# Patient Record
Sex: FEMALE | Race: WHITE | NOT HISPANIC OR LATINO | Employment: FULL TIME | URBAN - METROPOLITAN AREA
[De-identification: names, ages, dates, MRNs, and addresses within clinical notes are randomized per-mention and may not be internally consistent; named-entity substitution may affect disease eponyms.]

---

## 2021-10-27 ENCOUNTER — OFFICE VISIT (OUTPATIENT)
Dept: NEUROLOGY | Facility: CLINIC | Age: 51
End: 2021-10-27

## 2021-10-27 VITALS
HEIGHT: 63 IN | TEMPERATURE: 97.1 F | HEART RATE: 75 BPM | SYSTOLIC BLOOD PRESSURE: 118 MMHG | WEIGHT: 293 LBS | DIASTOLIC BLOOD PRESSURE: 80 MMHG | BODY MASS INDEX: 51.91 KG/M2 | OXYGEN SATURATION: 98 %

## 2021-10-27 DIAGNOSIS — R25.1 TREMOR: Primary | ICD-10-CM

## 2021-10-27 PROCEDURE — 99244 OFF/OP CNSLTJ NEW/EST MOD 40: CPT | Performed by: NURSE PRACTITIONER

## 2021-10-27 RX ORDER — CETIRIZINE HYDROCHLORIDE 10 MG/1
10 TABLET ORAL DAILY
COMMUNITY
Start: 2009-04-01

## 2021-10-27 RX ORDER — DULOXETIN HYDROCHLORIDE 30 MG/1
30 CAPSULE, DELAYED RELEASE ORAL DAILY
COMMUNITY
Start: 2021-03-04 | End: 2022-01-18 | Stop reason: SDUPTHER

## 2021-10-27 RX ORDER — ACETAMINOPHEN,DIPHENHYDRAMINE HCL 500; 25 MG/1; MG/1
1 TABLET, FILM COATED ORAL NIGHTLY
COMMUNITY
Start: 2009-04-01 | End: 2022-07-27

## 2021-10-27 RX ORDER — TOPIRAMATE 25 MG/1
TABLET ORAL
Qty: 60 TABLET | Refills: 2 | Status: SHIPPED | OUTPATIENT
Start: 2021-10-27 | End: 2022-01-18

## 2021-10-27 RX ORDER — MULTIPLE VITAMINS W/ MINERALS TAB 9MG-400MCG
1 TAB ORAL DAILY
COMMUNITY
Start: 2020-06-01

## 2021-10-27 NOTE — PROGRESS NOTES
"Subjective:     Patient ID: Jovita Camara is a 51 y.o. female.    CC:   Chief Complaint   Patient presents with   • Tremors       HPI:   History of Present Illness   51 y.o. female presents as a new patient referred by BERNABE Enriquez for tremor.     Tremor in R hand, present for approximately 1 year and worsening over time. Denies pain, N/T. She is right handed and having difficulty with typing at work, cannot hold objects as long as she used to     Denies difficulty with ambulating, headaches, vision changes, vivid dreaming     Denies hx of kidney stones, no family hx of parkinsons or tremor     Medical Records reviewed:  R hand tremor, + ALESSANDRA seen Rheumatology likely arthritis.      The following portions of the patient's history were reviewed and updated as appropriate: allergies, current medications, past family history, past medical history, past social history, past surgical history and problem list.    Past Medical History:   Diagnosis Date   • Anxiety        Past Surgical History:   Procedure Laterality Date   • BILATERAL INSERTION OF EAR TUBES AND ADENOIDECTOMY         Social History     Socioeconomic History   • Marital status:    Tobacco Use   • Smoking status: Former Smoker   • Smokeless tobacco: Never Used   Vaping Use   • Vaping Use: Never used   Substance and Sexual Activity   • Alcohol use: Not Currently   • Drug use: Never       History reviewed. No pertinent family history.     Objective:  /80   Pulse 75   Temp 97.1 °F (36.2 °C) (Temporal)   Ht 158.8 cm (62.5\")   Wt (!) 165 kg (363 lb 6.4 oz)   SpO2 98%   BMI 65.41 kg/m²     Neurologic Exam     Mental Status   Oriented to person, place, and time.   Follows 3 step commands.   Attention: normal. Concentration: normal.   Speech: speech is normal   Level of consciousness: alert  Knowledge: good and consistent with education.   Able to name object. Able to read. Able to repeat. Able to write. Normal comprehension.     Cranial Nerves "     CN II   Visual fields full to confrontation.   Visual acuity: normal  Right visual field deficit: none  Left visual field deficit: none     CN III, IV, VI   Pupils are equal, round, and reactive to light.  Extraocular motions are normal.   Right pupil: Shape: regular. Reactivity: brisk. Consensual response: intact.   Left pupil: Shape: regular. Reactivity: brisk. Consensual response: intact.   Nystagmus: none   Diplopia: none  Ophthalmoparesis: none  Upgaze: normal  Downgaze: normal  Conjugate gaze: present  Vestibulo-ocular reflex: present    CN V   Facial sensation intact.   Right corneal reflex: normal  Left corneal reflex: normal    CN VII   Right facial weakness: none  Left facial weakness: none    CN VIII   Hearing: intact    CN IX, X   Palate: symmetric  Right gag reflex: normal  Left gag reflex: normal    CN XI   Right sternocleidomastoid strength: normal  Left sternocleidomastoid strength: normal    CN XII   Tongue: not atrophic  Fasciculations: absent  Tongue deviation: none    Motor Exam   Muscle bulk: normal  Overall muscle tone: normal  Right arm tone: normal  Left arm tone: normal  Right leg tone: normal  Left leg tone: normal    Strength   Strength 5/5 throughout.     Sensory Exam   Light touch normal.     Gait, Coordination, and Reflexes     Gait  Gait: normal    Coordination   Finger to nose coordination: normal  Tandem walking coordination: normal    Tremor   Resting tremor: present (R)  Intention tremor: absent  Action tremor: absent    Reflexes   Reflexes 2+ except as noted.       Physical Exam  Vitals and nursing note reviewed.   Constitutional:       Appearance: Normal appearance.   HENT:      Head: Normocephalic and atraumatic.   Eyes:      Extraocular Movements: Extraocular movements intact and EOM normal.      Pupils: Pupils are equal, round, and reactive to light.   Cardiovascular:      Rate and Rhythm: Normal rate.   Pulmonary:      Effort: Pulmonary effort is normal.   Skin:      General: Skin is warm and dry.   Neurological:      Mental Status: She is alert and oriented to person, place, and time.      Coordination: Finger-Nose-Finger Test normal.      Gait: Gait is intact. Tandem walk normal.      Deep Tendon Reflexes: Strength normal.   Psychiatric:         Mood and Affect: Mood normal.         Speech: Speech normal.         Assessment/Plan:       Diagnoses and all orders for this visit:    1. Tremor (Primary)  Assessment & Plan:  Ordered MRI Brain     Labs     Start TPM 25 mg PO QHS X 1 week then 50 mg PO QHS thereafter     F/U in 6 weeks or sooner if needed    Orders:  -     topiramate (Topamax) 25 MG tablet; Take one tablet nightly X 1 week then 2 tablets nightly thereafter.  Dispense: 60 tablet; Refill: 2  -     MRI Brain With & Without Contrast; Future  -     Comprehensive Metabolic Panel; Future  -     CBC & Differential; Future  -     Sedimentation Rate; Future  -     TSH; Future  -     Vitamin B12 & Folate; Future           Reviewed medications, potential side effects and signs and symptoms to report. Discussed risk versus benefits of treatment plan with patient and/or family-including medications, labs and radiology that may be ordered. Addressed questions and concerns during visit. Patient and/or family verbalized understanding and agree with plan. Patient instructed to call the office with questions or concerns and report to ED with life-threatening symptoms.     AS THE PROVIDER, I PERSONALLY WORE PPE DURING ENTIRE FACE TO FACE ENCOUNTER IN CLINIC WITH THE PATIENT. PATIENT ALSO WORE PPE DURING ENTIRE FACE TO FACE ENCOUNTER EXCEPT FOR A MAX OF 30 SECONDS DURING NEUROLOGICAL EVALUATION OF CRANIAL NERVES AND THEN MASK WAS PLACED BACK OVER PATIENT FACE FOR REMAINDER OF VISIT. I WASHED MY HANDS BEFORE AND AFTER VISIT.    During this visit the following were done:  Labs Reviewed []    Labs Ordered [x]    Radiology Reports Reviewed []    Radiology Ordered [x]    PCP Records Reviewed  []    Referring Provider Records Reviewed [x]    ER Records Reviewed []    Hospital Records Reviewed []    History Obtained From Family []    Radiology Images Reviewed []    Other Reviewed []    Records Requested []      Return in about 6 weeks (around 12/8/2021).      Kaycee Stoddard, BERNABE  10/27/2021

## 2021-10-27 NOTE — ASSESSMENT & PLAN NOTE
Ordered MRI Brain     Labs     Start TPM 25 mg PO QHS X 1 week then 50 mg PO QHS thereafter     F/U in 6 weeks or sooner if needed

## 2021-11-22 ENCOUNTER — TELEPHONE (OUTPATIENT)
Dept: NEUROLOGY | Facility: CLINIC | Age: 51
End: 2021-11-22

## 2021-11-22 NOTE — TELEPHONE ENCOUNTER
Spoke to patient to inform her that insurance denied the MRI even after Kaycee talked to the insurance company.

## 2021-11-22 NOTE — TELEPHONE ENCOUNTER
Called peer to peer line back as the original person hung up @ [phone]    Tracking #[id number]    Got the insurance provider on the line and Got her over to Kaycee. Thanks!

## 2021-11-22 NOTE — TELEPHONE ENCOUNTER
Hiral Chisholm called to let us know under media there is a note needing more info for the MRI documented in office note. This was documented in the referral on 11/18/21, but the MRI has now been denied.  Hiral will notify the patient and have them call our office for next steps, but would like me to reach out to Central Scheduling and get the MRI on the 28th cancelled.     Letting provider know as well. Thanks!

## 2021-11-22 NOTE — TELEPHONE ENCOUNTER
Please let the patient know that her insurance denied her MRI, even with a peer to peer. I asked about their guidelines for CT scan which was the same. I will see her back in December and we can re-evaluat then. If she develops new symptoms in the mean time please let me know.   Thanks!

## 2021-11-24 NOTE — TELEPHONE ENCOUNTER
April WITH [facility] CALLED   [phone]  SHE WANTED TO MAKE SURE A PEER TO PEER WAS DONE AND IF THE MRI APPT NEEDED TO BE CANCELLED.    WE SAW ENCOUNTER WHERE IT HAD BEEN DONE AND April IS GOING TO CANCEL APPT.

## 2021-11-28 ENCOUNTER — APPOINTMENT (OUTPATIENT)
Dept: MRI IMAGING | Facility: HOSPITAL | Age: 51
End: 2021-11-28

## 2021-12-06 ENCOUNTER — OFFICE VISIT (OUTPATIENT)
Dept: NEUROLOGY | Facility: CLINIC | Age: 51
End: 2021-12-06

## 2021-12-06 VITALS
OXYGEN SATURATION: 98 % | BODY MASS INDEX: 51.91 KG/M2 | HEIGHT: 63 IN | TEMPERATURE: 97.1 F | SYSTOLIC BLOOD PRESSURE: 138 MMHG | DIASTOLIC BLOOD PRESSURE: 80 MMHG | HEART RATE: 91 BPM | WEIGHT: 293 LBS

## 2021-12-06 DIAGNOSIS — R20.0 NUMBNESS AND TINGLING IN BOTH HANDS: ICD-10-CM

## 2021-12-06 DIAGNOSIS — R20.2 NUMBNESS AND TINGLING IN BOTH HANDS: ICD-10-CM

## 2021-12-06 DIAGNOSIS — R25.1 TREMOR: Primary | ICD-10-CM

## 2021-12-06 PROCEDURE — 99214 OFFICE O/P EST MOD 30 MIN: CPT | Performed by: NURSE PRACTITIONER

## 2021-12-06 NOTE — ASSESSMENT & PLAN NOTE
Decrease TPM to 25 mg PO BID X 3 days then stop.     Patient does not wish to try another medication at this time.     She wishes to wait on obtaining MRI until she has new insurance. Patient requests EMG for N/T/weakness.     F/U after EMG.

## 2021-12-06 NOTE — PROGRESS NOTES
Subjective:     Patient ID: Jovita Camara is a 51 y.o. female.    CC:   Chief Complaint   Patient presents with   • Tremors     6 week follow up        HPI:   History of Present Illness   51 y.o. female returns in follow up for tremor. At last appointment on 10/27/21 Started TPM, ordered labs and MRI Brain.     Labs 11/1/21: CBC, CMP, MAG, CK, B12/Folate, HA1C - NCS   CRP 1.1, Sed rate 31, ALESSANDRA +     Insurance declined coverage of her MRI. Patient is looking at [employer] during open enrollment.      Started on TPM 50 mg PO BID, feeling the Topamax is actually making the tremors worse.     N/T in bilateral upper extremities. R>L, right hand feels cold. Works as a  and uses 10 key constantly.     Tremor in R hand, present for approximately 1 year and worsening over time. Denies pain, N/T. She is right handed and having difficulty with typing at work, cannot hold objects as long as she used to      Denies difficulty with ambulating, headaches, vision changes, vivid dreaming      Denies hx of kidney stones, no family hx of parkinsons or tremor      Medical Records reviewed:  R hand tremor, + ALESSANDRA seen Rheumatology likely arthritis.      The following portions of the patient's history were reviewed and updated as appropriate: allergies, current medications, past family history, past medical history, past social history, past surgical history and problem list.    Past Medical History:   Diagnosis Date   • Anxiety        Past Surgical History:   Procedure Laterality Date   • BILATERAL INSERTION OF EAR TUBES AND ADENOIDECTOMY         Social History     Socioeconomic History   • Marital status:    Tobacco Use   • Smoking status: Former Smoker   • Smokeless tobacco: Never Used   Vaping Use   • Vaping Use: Never used   Substance and Sexual Activity   • Alcohol use: Not Currently   • Drug use: Never       History reviewed. No pertinent family history.     Objective:  /80   Pulse 91   Temp  "97.1 °F (36.2 °C)   Ht 158.8 cm (62.5\")   Wt (!) 162 kg (358 lb)   SpO2 98%   Breastfeeding No   BMI 64.44 kg/m²     Neurologic Exam     Mental Status   Oriented to person, place, and time.   Follows 3 step commands.   Attention: normal. Concentration: normal.   Speech: speech is normal   Level of consciousness: alert  Knowledge: good and consistent with education.   Able to name object. Able to read. Able to repeat. Able to write. Normal comprehension.     Cranial Nerves     CN II   Visual fields full to confrontation.   Visual acuity: normal  Right visual field deficit: none  Left visual field deficit: none     CN III, IV, VI   Pupils are equal, round, and reactive to light.  Extraocular motions are normal.   Right pupil: Shape: regular. Reactivity: brisk. Consensual response: intact.   Left pupil: Shape: regular. Reactivity: brisk. Consensual response: intact.   Nystagmus: none   Diplopia: none  Ophthalmoparesis: none  Upgaze: normal  Downgaze: normal  Conjugate gaze: present  Vestibulo-ocular reflex: present    CN V   Facial sensation intact.   Right corneal reflex: normal  Left corneal reflex: normal    CN VII   Right facial weakness: none  Left facial weakness: none    CN VIII   Hearing: intact    CN IX, X   Palate: symmetric  Right gag reflex: normal  Left gag reflex: normal    CN XI   Right sternocleidomastoid strength: normal  Left sternocleidomastoid strength: normal    CN XII   Tongue: not atrophic  Fasciculations: absent  Tongue deviation: none    Motor Exam   Muscle bulk: normal  Overall muscle tone: normal    Strength   Strength 5/5 except as noted.   Right interossei: 4/5  Left interossei: 4/5    Sensory Exam   Right arm light touch: decreased from fingers  Left arm light touch: decreased from fingers    Gait, Coordination, and Reflexes     Gait  Gait: normal    Tremor   Resting tremor: present (RUE )  Intention tremor: absent  Action tremor: absent    Reflexes   Reflexes 2+ except as noted. "       Physical Exam  Vitals and nursing note reviewed.   Constitutional:       Appearance: Normal appearance.   HENT:      Head: Normocephalic and atraumatic.   Eyes:      Extraocular Movements: EOM normal.      Pupils: Pupils are equal, round, and reactive to light.   Skin:     General: Skin is warm and dry.   Neurological:      Mental Status: She is alert and oriented to person, place, and time.      Gait: Gait is intact.   Psychiatric:         Mood and Affect: Mood normal.         Speech: Speech normal.         Assessment/Plan:       Diagnoses and all orders for this visit:    1. Tremor (Primary)  Assessment & Plan:  Decrease TPM to 25 mg PO BID X 3 days then stop.     Patient does not wish to try another medication at this time.     She wishes to wait on obtaining MRI until she has new insurance. Patient requests EMG for N/T/weakness.     F/U after EMG.     Orders:  -     EMG & Nerve Conduction Test; Future    2. Numbness and tingling in both hands  Assessment & Plan:  Decreased  and sensation in RUE > LUE     Ordered EMG/NCS     Orders:  -     EMG & Nerve Conduction Test; Future           Reviewed medications, potential side effects and signs and symptoms to report. Discussed risk versus benefits of treatment plan with patient and/or family-including medications, labs and radiology that may be ordered. Addressed questions and concerns during visit. Patient and/or family verbalized understanding and agree with plan. Patient instructed to call the office with questions or concerns and report to ED with life-threatening symptoms.     AS THE PROVIDER, I PERSONALLY WORE PPE DURING ENTIRE FACE TO FACE ENCOUNTER IN CLINIC WITH THE PATIENT. PATIENT ALSO WORE PPE DURING ENTIRE FACE TO FACE ENCOUNTER EXCEPT FOR A MAX OF 30 SECONDS DURING NEUROLOGICAL EVALUATION OF CRANIAL NERVES AND THEN MASK WAS PLACED BACK OVER PATIENT FACE FOR REMAINDER OF VISIT. I WASHED MY HANDS BEFORE AND AFTER VISIT.    During this visit the  following were done:  Labs Reviewed [x]    Labs Ordered []    Radiology Reports Reviewed []    Radiology Ordered []    PCP Records Reviewed []    Referring Provider Records Reviewed []    ER Records Reviewed []    Hospital Records Reviewed []    History Obtained From Family []    Radiology Images Reviewed []    Other Reviewed []    Records Requested []      Return for after EMG/NCS , Video visit.      Kaycee Stoddard, BERNABE  12/6/2021

## 2022-01-18 ENCOUNTER — OFFICE VISIT (OUTPATIENT)
Dept: INTERNAL MEDICINE | Facility: CLINIC | Age: 52
End: 2022-01-18

## 2022-01-18 VITALS
WEIGHT: 293 LBS | TEMPERATURE: 98 F | HEART RATE: 76 BPM | RESPIRATION RATE: 16 BRPM | HEIGHT: 61 IN | OXYGEN SATURATION: 97 % | DIASTOLIC BLOOD PRESSURE: 84 MMHG | BODY MASS INDEX: 55.32 KG/M2 | SYSTOLIC BLOOD PRESSURE: 136 MMHG

## 2022-01-18 DIAGNOSIS — M15.9 OSTEOARTHRITIS OF MULTIPLE JOINTS, UNSPECIFIED OSTEOARTHRITIS TYPE: ICD-10-CM

## 2022-01-18 DIAGNOSIS — Z12.11 COLON CANCER SCREENING: ICD-10-CM

## 2022-01-18 DIAGNOSIS — R25.1 TREMOR: ICD-10-CM

## 2022-01-18 DIAGNOSIS — F41.1 GENERALIZED ANXIETY DISORDER: Primary | ICD-10-CM

## 2022-01-18 DIAGNOSIS — E66.01 CLASS 3 SEVERE OBESITY DUE TO EXCESS CALORIES WITHOUT SERIOUS COMORBIDITY WITH BODY MASS INDEX (BMI) OF 60.0 TO 69.9 IN ADULT: ICD-10-CM

## 2022-01-18 PROBLEM — F32.5 MAJOR DEPRESSIVE DISORDER WITH SINGLE EPISODE, IN FULL REMISSION: Status: RESOLVED | Noted: 2022-01-18 | Resolved: 2022-01-18

## 2022-01-18 PROBLEM — F32.5 MAJOR DEPRESSIVE DISORDER WITH SINGLE EPISODE, IN FULL REMISSION: Status: ACTIVE | Noted: 2022-01-18

## 2022-01-18 PROBLEM — E66.813 CLASS 3 SEVERE OBESITY DUE TO EXCESS CALORIES WITHOUT SERIOUS COMORBIDITY WITH BODY MASS INDEX (BMI) OF 60.0 TO 69.9 IN ADULT: Status: ACTIVE | Noted: 2022-01-18

## 2022-01-18 PROCEDURE — 99214 OFFICE O/P EST MOD 30 MIN: CPT | Performed by: PHYSICIAN ASSISTANT

## 2022-01-18 RX ORDER — B-COMPLEX WITH VITAMIN C
100 TABLET ORAL
COMMUNITY
End: 2022-06-15

## 2022-01-18 RX ORDER — DULOXETIN HYDROCHLORIDE 30 MG/1
30 CAPSULE, DELAYED RELEASE ORAL DAILY
Qty: 90 CAPSULE | Refills: 1 | Status: SHIPPED | OUTPATIENT
Start: 2022-01-18 | End: 2022-07-24 | Stop reason: SDUPTHER

## 2022-01-18 NOTE — ASSESSMENT & PLAN NOTE
Patient's (Body mass index is 66.79 kg/m².) indicates that they are morbidly obese (BMI > 40 or > 35 with obesity - related health condition) with health conditions that include none . Weight is unchanged. BMI is is above average; BMI management plan is completed. We discussed portion control and increasing exercise.

## 2022-01-18 NOTE — ASSESSMENT & PLAN NOTE
Psychological condition is improving with treatment.  Continue current treatment regimen.  Psychological condition  will be reassessed in 3 months. Will cont on Cymbalta at this time.

## 2022-01-18 NOTE — PROGRESS NOTES
Chief Complaint  Establish Care and Anxiety    Subjective          History of Present Illness  Jovita Camara presents to [facility] to establish care.  Anxiety/Depression:  Has been on Cymbalta for the last 1.5 years and doing well with that. Tried 60mg dose in the past of Cymbalta but it made her to sleepy so is doing well on 30mg and not having side effects.  No HI/SI.    Tremor:  Has a shaky hand on the right side. Was started on Topamax but it did not help. She has an EMG this week for this. Is followed by Kaycee gabriel. The tremor is worse at rest.     Arthritis:  Is followed with rheumatology for this due to elevated CRP/ESR and pos ALESSANDRA.    Vit D Def:  Is taking vit D daily.     Fhx AMI/AAA:  Does not have chest pains or trouble breathing, gets occ heart burn and takes otc acid blocker for this. Does have some exercise intolerance with her weight. Has not had to see cardio in the past for anything. Has not had EKG in the past. Her uncles and dad have hx aortic aneurysm. Great uncles  of AMI in 50s and 60s.     Last mammogram May 2021 and it was normal.   Never had colonoscopy, neg fecal occult. Dad has hx of colon polyps.  Last Pap 2021, no period for the last year. Normal paps in the past. Does not have GYN that she sees      Review of Systems   Constitutional: Negative for fever and unexpected weight loss.   Respiratory: Negative for cough, shortness of breath and wheezing.    Cardiovascular: Negative for chest pain and palpitations.       The following portions of the patient's history were reviewed and updated as appropriate: allergies, current medications, past family history, past medical history, past social history, past surgical history and problem list.    Allergies   Allergen Reactions   • Molds & Smuts Cough and Headache   • Seasonal Ic [Cholestatin] Other (See Comments), Cough and Headache     Burtrum dust     Current Outpatient Medications on File  Prior to Visit   Medication Sig Dispense Refill   • ascorbic acid (VITAMIN C) 100 MG tablet Take 100 mg by mouth Daily.     • calcium-vitamin D 250-100 MG-UNIT per tablet Take 1 tablet by mouth 2 (Two) Times a Day.     • cetirizine (zyrTEC) 10 MG tablet Take 10 mg by mouth Daily.     • diphenhydrAMINE-acetaminophen (TYLENOL PM)  MG tablet per tablet Take 1 tablet by mouth Every Night.     • multivitamin with minerals (One-A-Day Womens 50+ Advantage) tablet tablet Take 1 tablet by mouth Daily.     • Zinc 100 MG tablet Take 100 mg by mouth.     • [DISCONTINUED] DULoxetine (CYMBALTA) 30 MG capsule Take 30 mg by mouth Daily.     • [DISCONTINUED] topiramate (Topamax) 25 MG tablet Take one tablet nightly X 1 week then 2 tablets nightly thereafter. 60 tablet 2     No current facility-administered medications on file prior to visit.     New Medications Ordered This Visit   Medications   • DULoxetine (CYMBALTA) 30 MG capsule     Sig: Take 1 capsule by mouth Daily.     Dispense:  90 capsule     Refill:  1       Past Medical History:   Diagnosis Date   • Allergic    • Anxiety    • Arthritis    • Encephalitis    • Headache       Past Surgical History:   Procedure Laterality Date   • ADENOIDECTOMY     • BILATERAL INSERTION OF EAR TUBES AND ADENOIDECTOMY     • TONSILLECTOMY        Family History   Problem Relation Age of Onset   • Arthritis Mother    • Hypertension Mother    • Thyroid disease Mother    • Hyperlipidemia Father    • Hypertension Father    • Bipolar disorder Sister    • Migraine headaches Sister    • Heart attack Paternal Grandmother       Social History     Socioeconomic History   • Marital status:    Tobacco Use   • Smoking status: Former Smoker     Packs/day: 0.25     Years: 23.00     Pack years: 5.75     Types: Cigarettes     Start date: 1988     Quit date: 12/2011     Years since quitting: 10.1   • Smokeless tobacco: Never Used   Vaping Use   • Vaping Use: Never used   Substance and Sexual  "Activity   • Alcohol use: Not Currently   • Drug use: Never   • Sexual activity: Not Currently     Partners: Male     Birth control/protection: None        Objective   Vital Signs:   Vitals:    01/18/22 0811   BP: 136/84   Pulse: 76   Resp: 16   Temp: 98 °F (36.7 °C)   TempSrc: Infrared   SpO2: 97%   Weight: (!) 162 kg (356 lb 6.4 oz)   Height: 155.6 cm (61.25\")      Body mass index is 66.79 kg/m².  Physical Exam  Vitals reviewed.   Constitutional:       General: She is not in acute distress.     Appearance: Normal appearance. She is obese.   HENT:      Head: Normocephalic and atraumatic.   Eyes:      General: No scleral icterus.     Extraocular Movements: Extraocular movements intact.      Conjunctiva/sclera: Conjunctivae normal.   Cardiovascular:      Rate and Rhythm: Normal rate and regular rhythm.      Heart sounds: Normal heart sounds. No murmur heard.      Pulmonary:      Effort: Pulmonary effort is normal. No respiratory distress.      Breath sounds: Normal breath sounds. No stridor. No wheezing or rhonchi.   Musculoskeletal:      Cervical back: Normal range of motion and neck supple.   Skin:     General: Skin is warm and dry.      Coloration: Skin is not jaundiced.   Neurological:      General: No focal deficit present.      Mental Status: She is alert and oriented to person, place, and time.      Gait: Gait normal.   Psychiatric:         Mood and Affect: Mood normal.         Behavior: Behavior normal.          Result Review :                   Assessment and Plan    Diagnoses and all orders for this visit:    1. Generalized anxiety disorder (Primary)  Assessment & Plan:  Psychological condition is improving with treatment.  Continue current treatment regimen.  Psychological condition  will be reassessed in 3 months. Will cont on Cymbalta at this time.     Orders:  -     DULoxetine (CYMBALTA) 30 MG capsule; Take 1 capsule by mouth Daily.  Dispense: 90 capsule; Refill: 1    2. Class 3 severe obesity due to " excess calories without serious comorbidity with body mass index (BMI) of 60.0 to 69.9 in adult (HCC)  Assessment & Plan:  Patient's (Body mass index is 66.79 kg/m².) indicates that they are morbidly obese (BMI > 40 or > 35 with obesity - related health condition) with health conditions that include none . Weight is unchanged. BMI is is above average; BMI management plan is completed. We discussed portion control and increasing exercise.     Orders:  -     Ambulatory Referral to Nutrition Services    3. Osteoarthritis of multiple joints, unspecified osteoarthritis type  Assessment & Plan:  Follow up with rheumatology as directed.       4. Tremor   F/u with neuro as directed, keep appt for EMG    5. Colon cancer screening  -     Cologuard - Stool, Per Rectum; Future        Follow Up   Return in about 6 months (around 7/18/2022).    Follow up if symptoms worsen or persist or has new or concerning symptoms, go to ER for severe symptoms.   Reviewed common medication effects and side effects and to report side effects immediately, the patient expressed good understanding.  Encouraged medication compliance and the importance of keeping scheduled follow up appointments with me and any other providers.  If a referral was made please contact our office if you have not heard about an appointment in the next 2 weeks.   If labs or images are ordered we will contact you with the results within the next week.  If you have not heard from us after a week please call our office to inquire about the results.   Patient was given instructions and counseling regarding her condition or for health maintenance advice. Please see specific information pulled into the AVS if appropriate.     Fatoumata Foreman PA-C    * Please note that portions of this note were completed with a voice recognition program.

## 2022-01-20 ENCOUNTER — PROCEDURE VISIT (OUTPATIENT)
Dept: NEUROLOGY | Facility: CLINIC | Age: 52
End: 2022-01-20

## 2022-01-20 ENCOUNTER — PATIENT ROUNDING (BHMG ONLY) (OUTPATIENT)
Dept: INTERNAL MEDICINE | Facility: CLINIC | Age: 52
End: 2022-01-20

## 2022-01-20 DIAGNOSIS — R25.1 TREMOR: ICD-10-CM

## 2022-01-20 PROCEDURE — 95909 NRV CNDJ TST 5-6 STUDIES: CPT | Performed by: PSYCHIATRY & NEUROLOGY

## 2022-01-20 PROCEDURE — 95886 MUSC TEST DONE W/N TEST COMP: CPT | Performed by: PSYCHIATRY & NEUROLOGY

## 2022-01-20 NOTE — PROGRESS NOTES
[facility]   Electrodiagnostic Laboratory    Nerve Conduction & EMG Report        Patient:  Jovita Camara   Patient ID: [id number]   YOB: 1970  Sex:  female      Exam Physician:  Monty Ross MD  Refer Physician:  Marilyn KIDD      Electromyogram and Nerve Conduction Velocity Procedure Note    Hx: 51 y.o. right handed female with complaint of tremor in right hand. Symptoms have been present for several months and were provoked by positioning. Significant past medical history includes nothing suggestive of neuropathy.  Family history no family history of nerve or muscle disease.    Exam: Motor power is normal. There is no atrophy. There are no fasciculations. Deep tendon reflexes are present and symmetrical. Sensory exam is normal.  Right hand resting tremor, extinguished with distraction, varying frequency and amplitue    Edx studies of the R UE were performed to evaluate for peripheral nerve entrapment.     NCS Examination   For sensory nerve conduction studies, the amplitude is measured peak-to-peak, the latency reported is the distal peak latency, and the conduction velocity, if measured, is determined from onset latencies and is over the forearm.   For motor nerve conduction studies, the amplitude is measured baseline-to-peak, the latency reported is the distal onset latency, the conduction velocity is calculated over the forearm, and the F wave latency is the minimum latency.   Unless otherwise noted, the hand temperature was monitored continuously and remained between 32°C and 36°C during the performance of the NCSs.        Sensory NCS      Nerve / Sites Rec. Site Onset Lat Peak Lat NP Amp PP Amp Segments Distance Velocity     ms ms µV µV  cm m/s   R Median - Digit II (Antidromic)      Wrist Dig II 2.55 3.39 60.4 95.7 Wrist - Dig II 13 51   R Ulnar - Digit V (Antidromic)      Wrist Dig V 1.93 2.66 38.9 91.0 Wrist - Dig V 11 57   R Radial - Anatomical snuff box  (Forearm)      Forearm Wrist 1.20 1.67 27.7 1.9 Forearm - Wrist 10 83             Motor NCS      Nerve / Sites Muscle Latency Amplitude Amp % Duration Segments Distance Lat Diff Velocity     ms mV % ms  cm ms m/s   R Median - APB      Wrist APB 2.40 10.2 100 8.85 Wrist - APB 7        Elbow APB 6.93 4.5 44.3 8.39 Elbow - Wrist 24 4.53 53   R Ulnar - ADM      Wrist ADM 2.08 8.7 100 5.63 Wrist - ADM 7        B.Elbow ADM 5.99 6.5 74.9 6.20 B.Elbow - Wrist 23 3.91 59      A.Elbow ADM 6.77 1.5 16.9 5.99 A.Elbow - B.Elbow 7 0.78 90           F  Wave      Nerve F Lat M Lat F-M Lat    ms ms ms   R Median - APB 27.7 2.3 25.3   R Ulnar - ADM 26.5 2.3 24.2               EMG Examination   The study was performed with a concentric needle electrode. Fibrillation and fasciculation activity is graded from none (0) to continuous (4+). The configuration and recruitment pattern of motor unit action potentials under voluntary control, if not normal, are described below           EMG Summary Table     Spontaneous MUAP Recruitment   Muscle Nerve Roots IA Fib PSW Fasc H.F. Amp Dur. PPP Pattern   R. Abductor pollicis brevis Median C8-T1 N None None None None N N N N   R. First dorsal interosseous Ulnar C8-T1 N None None None None N N N N   R. Pronator teres Median C6-C7 N None None None None N N N N   R. Triceps brachii Radial C6-C8 N None None None None N N N N   R. Biceps brachii Musculocutaneous C5-C6 N None None None None N N N N   R. Deltoid Axillary C5-C6 N None None None None N N N N       Summary    The motor conduction test was normal in all 2 of the tested nerves: R Median - APB, R Ulnar - ADM.    The sensory conduction test was normal in all 3 of the tested nerves: R Median - Digit II (Antidromic), R Ulnar - Digit V (Antidromic), R Radial - Anatomical snuff box (Forearm).    The F wave study was normal in all 2 of the tested nerves: R Median - APB, R Ulnar - ADM.    The needle EMG study was normal in all 6 tested muscles: EDWARD  Abductor pollicis brevis, R. First dorsal interosseous, R. Pronator teres, R. Triceps brachii, R. Biceps brachii, R. Deltoid.         Conclusion: Normal NCV and EMG of the right upper extremity          Instrument used:  Teca Synergy        Performed by:          Monty Ross MD

## 2022-01-20 NOTE — PROGRESS NOTES
A [employer] message has been sent to the patient for patient rounding with [facility].

## 2022-01-21 ENCOUNTER — TELEMEDICINE (OUTPATIENT)
Dept: NEUROLOGY | Facility: CLINIC | Age: 52
End: 2022-01-21

## 2022-01-21 DIAGNOSIS — R25.1 TREMOR: Primary | Chronic | ICD-10-CM

## 2022-01-21 PROCEDURE — 99212 OFFICE O/P EST SF 10 MIN: CPT | Performed by: NURSE PRACTITIONER

## 2022-01-21 NOTE — ASSESSMENT & PLAN NOTE
Essential tremor     Weakness likely R/T arthritis, patient having further workup with Rheumatology.     Discussed potential of starting Propranolol or Occupational therapy.     Patient would like to consider options and will notify me if she wishes to pursue treatment.     F/U in 6 months or sooner if needed

## 2022-01-21 NOTE — PROGRESS NOTES
Subjective:     Patient ID: Jovita Camara is a 51 y.o. female.    CC: No chief complaint on file.    This visit was conducted via video visit. The patient consented to receiving care via video visit prior to appointment.     HPI:   History of Present Illness   51 y.o. female returns in follow up for tremor. At last appointment on 12/6/21 stopped TPM, ordered EMG/NCS.      Labs 11/1/21: CBC, CMP, MAG, CK, B12/Folate, HA1C - NCS   CRP 1.1, Sed rate 31, ALESSANDRA +     EMG/NCS 1/20/22: normal.      TPM caused worsening of tremor.      N/T in bilateral upper extremities. R>L, right hand feels cold. Works as a  and uses 10 key constantly.     Is following up with Rheumatology to further investigate arthritis.     No new or worsening symptoms.     PH:    Tremor in R hand, present for approximately 1 year and worsening over time. Denies pain, N/T. She is right handed and having difficulty with typing at work, cannot hold objects as long as she used to      Denies difficulty with ambulating, headaches, vision changes, vivid dreaming      Denies hx of kidney stones, no family hx of parkinsons or tremor      Medical Records reviewed:  R hand tremor, + ALESSANDRA seen Rheumatology likely arthritis.      The following portions of the patient's history were reviewed and updated as appropriate: allergies, current medications, past family history, past medical history, past social history, past surgical history and problem list.    Past Medical History:   Diagnosis Date   • Allergic    • Anxiety    • Arthritis    • Encephalitis    • Headache        Past Surgical History:   Procedure Laterality Date   • ADENOIDECTOMY     • BILATERAL INSERTION OF EAR TUBES AND ADENOIDECTOMY     • TONSILLECTOMY         Social History     Socioeconomic History   • Marital status:    Tobacco Use   • Smoking status: Former Smoker     Packs/day: 0.25     Years: 23.00     Pack years: 5.75     Types: Cigarettes     Start date: 1988     Quit date:  12/2011     Years since quitting: 10.1   • Smokeless tobacco: Never Used   Vaping Use   • Vaping Use: Never used   Substance and Sexual Activity   • Alcohol use: Not Currently   • Drug use: Never   • Sexual activity: Not Currently     Partners: Male     Birth control/protection: None       Family History   Problem Relation Age of Onset   • Arthritis Mother    • Hypertension Mother    • Thyroid disease Mother    • Hyperlipidemia Father    • Hypertension Father    • Bipolar disorder Sister    • Migraine headaches Sister    • Heart attack Paternal Grandmother         Objective:  There were no vitals taken for this visit.    Neurologic Exam     Mental Status   Oriented to person, place, and time.   Attention: normal.   Speech: speech is normal   Level of consciousness: alert  Knowledge: good.   Normal comprehension.     Cranial Nerves     CN VII   Facial expression full, symmetric.       Physical Exam  Neurological:      Mental Status: She is oriented to person, place, and time.   Psychiatric:         Speech: Speech normal.         Assessment/Plan:       Diagnoses and all orders for this visit:    1. Tremor (Primary)  Assessment & Plan:  Essential tremor     Weakness likely R/T arthritis, patient having further workup with Rheumatology.     Discussed potential of starting Propranolol or Occupational therapy.     Patient would like to consider options and will notify me if she wishes to pursue treatment.     F/U in 6 months or sooner if needed              Reviewed medications, potential side effects and signs and symptoms to report. Discussed risk versus benefits of treatment plan with patient and/or family-including medications, labs and radiology that may be ordered. Addressed questions and concerns during visit. Patient and/or family verbalized understanding and agree with plan. Patient instructed to call the office with questions or concerns and report to ED with life-threatening symptoms.     During this visit the  following were done:  Labs Reviewed []    Labs Ordered []    Radiology Reports Reviewed []    Radiology Ordered []    PCP Records Reviewed []    Referring Provider Records Reviewed []    ER Records Reviewed []    Hospital Records Reviewed []    History Obtained From Family []    Radiology Images Reviewed []     Other Reviewed []    Records Requested []      I spent a total of 10 minutes via video visit providing patient care.       Return in about 6 months (around 7/21/2022).      Kaycee Stoddard, APRN  1/21/2022

## 2022-02-07 ENCOUNTER — TELEPHONE (OUTPATIENT)
Dept: INTERNAL MEDICINE | Facility: CLINIC | Age: 52
End: 2022-02-07

## 2022-02-07 NOTE — TELEPHONE ENCOUNTER
----- Message from Fatoumata Foreman PA-C sent at 2/6/2022  9:30 PM EST -----  Your Cologuard test was negative, no concern for cancer, it is recommended to screen again in 3 years.

## 2022-03-07 ENCOUNTER — HOSPITAL ENCOUNTER (OUTPATIENT)
Dept: NUTRITION | Facility: HOSPITAL | Age: 52
Setting detail: RECURRING SERIES
Discharge: HOME | End: 2022-03-07

## 2022-03-07 VITALS — WEIGHT: 293 LBS | BODY MASS INDEX: 55.32 KG/M2 | HEIGHT: 61 IN

## 2022-03-07 PROCEDURE — 97802 MEDICAL NUTRITION INDIV IN: CPT

## 2022-03-07 NOTE — CONSULTS
"Adult Outpatient Nutrition  Assessment/PES    Patient Name: Jovita Camara  YOB: 1970  MRN: [id number]      Assessment Date: 03/07/22      This medical referred consult was provided via telehealth as patient was unable to attend an in-office appointment today due to the COVID-19 crisis. Consent for treatment was given verbally. RD spent a total of 60 minutes with patient today.      Reason for visit:     Patient is a 51 year old female who is referred today for weight loss counseling.       Nutrition assessment comments: patient completed assessment via My Chart     Session Details:     Attendees: patient, daughter present as well   Language/communication details: English via telephone   Barriers to learning: patient does disclose hearing loss and financial stress problems   Details of home: lives at home with her father and 15 y/o daughter     Anthropometrics:     Ht Readings from Last 1 Encounters:   03/07/22 155.6 cm (61.25\")      Wt Readings from Last 1 Encounters:   03/07/22 (!) 166 kg (365 lb)      BMI Readings from Last 1 Encounters:   03/07/22 68.40 kg/m²      Ideal body weight: 48.4 kg (106 lb 10.4 oz)  Adjusted ideal body weight: 95.2 kg (209 lb 15.8 oz)     Retired Ideal Body Weight (IBW) (kg): 48.72  Retired % Ideal Body Weight: 339.84     Recent weight change: patient reports 20 lb weight gain in past 1 year     Nutrition Assessment:     Who prepares most meals: self; or eats out   Who does grocery shopping: patient   Food insecurity: no  Food allergies/intolerances/aversions: NKFA  Difficulty chewing: no  Difficulty swallowing: no  Gastrointestinal symptoms that impact intake or food choices:no  Diet requirements related to personal preference or cultural belief: n/a    Diet recall:   Time Food/beverages consumed Quantity    Breakfast Weiss's: 2 rodrigues,egg, and cheese english muffins, 1 hashbrown, diet soda          AM snack 1/4-1/2 cup mixed nuts          Lunch Qdoba bowl: brown rice, " ground beef, guac, sour cream, queso, corn salsa with tortilla chips         Dinner  Varies; chicken pot pie; bag meal, sandwich, etc.       Physical activity: no PA disclosed today     Activity or exercise program:  Frequency of exercise:   Duration of exercise:     Assessed Needs:     Estimated energy needs: TIFFANY López1.2/1.3 average-500 kcal per day= 9073-0931 kcal per day   Estimated protein needs: 0.8g/kg= 130 g protein per day   Estimated fluid needs:     PES Statement:     Obesity  related to increased fat intake; minimal fruit and vegetable intake as evidenced by pt report of 20 lb weight gain in 1 year and BMI.     Intervention Goal(s):  1. Attempt to adhere to 8016-6527 kcal goal daily and track via lopez until follow  Up  2. Attempt to incorporate more balance into meal times with plate method as guide     Interventions/Recommendations:    Pt reports that she has been consistently gaining weight over the past 10 years. She states it has gotten much worse in the past 2 years. She reports she is in the early stages of menopause and feels as if her hormones need to be tested at this time as well, because she believes they could be 'off'. Patient states that she has a 15 year old daughter that she wants to be healthier for. We spoke about patient's daily eating habits. She reports that she does not cook a lot, typically eats breakfast and lunch out daily M-F. She drinks diet pepsi and water. She does not love fruits and vegetables but after reviewing benefits, she is going to attempt to increase intake of these. RD spoke with pt about abiding by the my plate method to ensure that they are providing their body with adequate macro and micronutrients from multiple food groups, we spoke about the importance of a balanced diet in achieving weight loss that is long term. RD spoke with pt about the importance of portion sizes as well. Portion control plays an important role in being more mindful of calorie intake. Pt's  estimated calorie needs are: ~6222-8849 kcal per day (MSJ x 1.2/1.3 average- 500 kcal per day for desired weight loss). Pt estimated protein needs are: 130 g/day (0.8 g/kg/day). RD encouraged pt to attempt keeping a food diary via paper journal or if comfortable with technology, an lopez such as My Fitness pal or lose it. Patient is agreeable to this at this time.       Resources Provided:   Plate method   Snack lists  Smoothie recipes  Weight loss toolkit      Total of 60 minutes spent with patient on nutrition counseling. Education based on Academy of Nutrition and Dietetics guidelines. Patient was provided with RD's contact information. Follow up visit is scheduled for 4/14/2022 @ 2PM. Thank you for this referral.

## 2022-04-14 ENCOUNTER — HOSPITAL ENCOUNTER (OUTPATIENT)
Dept: NUTRITION | Facility: HOSPITAL | Age: 52
Setting detail: RECURRING SERIES
Discharge: HOME | End: 2022-04-14

## 2022-04-14 NOTE — PROGRESS NOTES
Nutrition Services    Patient Name:  Jovita Camara  YOB: 1970  MRN: [id number]  Admit Date:  4/14/2022    This medical referred consult was provided via telehealth per patient request. Consent for treatment was given verbally. RD spent a total of 30 minutes with patient today.     Patient reports today for her weight loss follow up visit. Was initially seen on 3/7/2022. She reports that she has been trying to make healthier food choices. Choosing chicken more often than red meat. She is increasing her water intake. She reports having salad kits more frequently as well as increased vegetables. She is snacking on apples. She has been staying away from regular chips, choosing baked chips or light popcorn. She is tracking her intake, staying close to 2000 kcal per day. She does report that she has only gone over ~5x since she started tracking last month. She was able to share a 24 hr recall with RD today.     24 hr recall:  B: Weiss's Rodrigues egg and cheese muffin and chicken mcmuffin (used to be 2 rodrigues mcmuffins)  AM snack: handful of nuts  L: lemon pepper tuna salad with 22 mini club crackers  D: homecooked meal of some kind     She does express frustration as she has not experienced any weight loss in the past month. She does disclose that she believes she is in the beginning of stages of menopause, wondering if her hormones could play a role. RD communicated to pt that this is likely with menopause, she plans to speak with her PCP regarding this. RD encouraged increased strength/resistance exercises to increase fat burning capability as able. Patient plans to do this. She is motivated to continue making changes at this time. We have scheduled a continued follow up for 6/2/2022 @ 2:00 PM.        Intervention Goal(s):  1. Attempt to adhere to 2006-3377 kcal goal daily and track via lopez until follow  Up-100% met   2. Attempt to incorporate more balance into meal times with plate method as guide -75%  met     Electronically signed by:  Candida Gomez RD  04/14/22 14:19 EDT

## 2022-06-02 ENCOUNTER — HOSPITAL ENCOUNTER (OUTPATIENT)
Dept: NUTRITION | Facility: HOSPITAL | Age: 52
Setting detail: RECURRING SERIES
Discharge: HOME | End: 2022-06-02

## 2022-06-02 PROCEDURE — 97803 MED NUTRITION INDIV SUBSEQ: CPT

## 2022-06-02 NOTE — PROGRESS NOTES
Nutrition Services    Patient Name:  Jovita Camara  YOB: 1970  MRN: [id number]  Admit Date:  6/2/2022    This medical referred consult was provided via telehealth per patient request. Consent for treatment was given verbally. MARCI spent a total of 30 minutes with patient today.     Patient reports for her follow up visit today via telephone. She states that she has been making good progress since her last visit on 4/14/2022. At previous follow up she was frustrated with her lack of progress. She has continued to stay consistent with her intake and is making healthier choices throughout the day as well. She feels she has really cut down on snacking and portion sizes. She is continuing to track as well. She has lost 8 lb since last visit. She reports that she has started a new job and is doing more stairs at work and more walking at home with a new puppy. She is attempting to increase her physical activity as able. Patient is motivated to continue with these changes at this time. We have scheduled a continued follow up for 7/5/2022 @ 12 PM.     Intervention Goal(s):  1. Attempt to adhere to 0922-5321 kcal goal daily and track via lopez until follow  Up-100% met   2. Attempt to incorporate more balance into meal times with plate method as guide -75% met   3. Healthful weight loss-100% met (8 lb down)      Electronically signed by:  Candida Gomez RD  06/02/22 16:01 EDT

## 2022-06-15 ENCOUNTER — HOSPITAL ENCOUNTER (OUTPATIENT)
Dept: GENERAL RADIOLOGY | Facility: HOSPITAL | Age: 52
Discharge: HOME | End: 2022-06-15
Admitting: NURSE PRACTITIONER

## 2022-06-15 ENCOUNTER — TELEPHONE (OUTPATIENT)
Dept: INTERNAL MEDICINE | Facility: CLINIC | Age: 52
End: 2022-06-15

## 2022-06-15 ENCOUNTER — OFFICE VISIT (OUTPATIENT)
Dept: INTERNAL MEDICINE | Facility: CLINIC | Age: 52
End: 2022-06-15

## 2022-06-15 VITALS
BODY MASS INDEX: 55.32 KG/M2 | WEIGHT: 293 LBS | TEMPERATURE: 97.7 F | RESPIRATION RATE: 20 BRPM | OXYGEN SATURATION: 96 % | SYSTOLIC BLOOD PRESSURE: 128 MMHG | DIASTOLIC BLOOD PRESSURE: 84 MMHG | HEART RATE: 77 BPM | HEIGHT: 61 IN

## 2022-06-15 DIAGNOSIS — M25.561 ACUTE PAIN OF RIGHT KNEE: Primary | ICD-10-CM

## 2022-06-15 DIAGNOSIS — M25.561 ACUTE PAIN OF RIGHT KNEE: ICD-10-CM

## 2022-06-15 PROCEDURE — 73560 X-RAY EXAM OF KNEE 1 OR 2: CPT

## 2022-06-15 PROCEDURE — 99213 OFFICE O/P EST LOW 20 MIN: CPT | Performed by: NURSE PRACTITIONER

## 2022-06-15 NOTE — PROGRESS NOTES
Jovita Camara presents to [facility] for     Chief Complaint  Chief Complaint   Patient presents with   • Knee Pain     Fell 2 weeks ago - R lateral patella - bruising & knot          Subjective     {CC  Problem List  Visit Diagnosis   Encounters  Notes  Medications  Labs  Result Review Imaging  Media :23}   Knee Pain   The incident occurred more than 1 week ago (2 weeks ago ). The incident occurred at home. The injury mechanism was a fall. The pain is present in the right knee. The quality of the pain is described as aching. The pain is at a severity of 8/10. The pain has been worsening since onset. Pertinent negatives include no inability to bear weight, loss of motion, loss of sensation, muscle weakness, numbness or tingling. She reports no foreign bodies present. The symptoms are aggravated by palpation and weight bearing. She has tried nothing for the symptoms. The treatment provided no relief.           Review of Systems   Constitutional: Negative for fatigue, fever and unexpected weight loss.   Eyes: Negative for blurred vision, double vision and visual disturbance.   Respiratory: Negative for cough, shortness of breath and wheezing.    Cardiovascular: Negative for chest pain, palpitations and leg swelling.   Gastrointestinal: Negative for abdominal pain, constipation, diarrhea, nausea and vomiting.   Genitourinary: Negative for difficulty urinating, frequency and urgency.   Musculoskeletal: Positive for arthralgias, gait problem and myalgias.   Skin: Negative for color change and rash.   Neurological: Negative for dizziness, tingling, weakness, numbness and headache.   Hematological: Negative for adenopathy. Does not bruise/bleed easily.         Allergies   Allergen Reactions   • Molds & Smuts Cough and Headache   • Seasonal Ic [Cholestatin] Other (See Comments), Cough and Headache     Woolrich dust     Current Outpatient Medications on File Prior to Visit   Medication  "Sig Dispense Refill   • ascorbic acid (VITAMIN C) 100 MG tablet Take 100 mg by mouth Daily.     • calcium-vitamin D 250-100 MG-UNIT per tablet Take 1 tablet by mouth 2 (Two) Times a Day.     • cetirizine (zyrTEC) 10 MG tablet Take 10 mg by mouth Daily.     • diphenhydrAMINE-acetaminophen (TYLENOL PM)  MG tablet per tablet Take 1 tablet by mouth Every Night.     • DULoxetine (CYMBALTA) 30 MG capsule Take 1 capsule by mouth Daily. 90 capsule 1   • multivitamin with minerals tablet tablet Take 1 tablet by mouth Daily.       No current facility-administered medications on file prior to visit.         The following portions of the patient's history were reviewed and updated as appropriate: allergies, current medications, past family history, past medical history, past social history, past surgical history and problem list and are available for review within electronic record    Objective     Result Review :                    Vital Signs:   /84   Pulse 77   Temp 97.7 °F (36.5 °C) (Infrared)   Resp 20   Ht 155.6 cm (61.25\")   Wt (!) 156 kg (345 lb)   SpO2 96%   BMI 64.66 kg/m²         Physical Exam  Constitutional:       Appearance: Normal appearance. She is well-developed.   HENT:      Head: Normocephalic and atraumatic.   Eyes:      Conjunctiva/sclera: Conjunctivae normal.   Cardiovascular:      Rate and Rhythm: Normal rate and regular rhythm.      Heart sounds: Normal heart sounds.   Pulmonary:      Effort: Pulmonary effort is normal.      Breath sounds: Normal breath sounds.   Abdominal:      General: Bowel sounds are normal.      Palpations: Abdomen is soft.      Tenderness: There is no abdominal tenderness.   Musculoskeletal:         General: Normal range of motion.      Cervical back: Normal range of motion.      Right knee: Swelling and bony tenderness present. No deformity, effusion, erythema, ecchymosis, lacerations or crepitus. Normal range of motion. No tenderness. Normal alignment. "   Skin:     General: Skin is warm and dry.   Neurological:      Mental Status: She is alert and oriented to person, place, and time.   Psychiatric:         Behavior: Behavior normal.         Thought Content: Thought content normal.         Judgment: Judgment normal.                 Assessment and Plan      Diagnoses and all orders for this visit:    1. Acute pain of right knee (Primary)  -     XR Knee 1 or 2 View Right; Future  -     Diclofenac Sodium (VOLTAREN) 1 % gel gel; Apply 4 g topically to the appropriate area as directed 4 (Four) Times a Day As Needed (pain).  Dispense: 200 g; Refill: 1    We will go ahead and get her set up to get an x-ray of the knee since she did have a fall.  I suspect this is more of a contusion.  Can apply diclofenac gel as needed.  Encouraged RICE therapy.  Apply a compressive ACE bandage. Rest and elevate the affected painful area.  Apply cold compresses intermittently as needed.  As pain recedes, begin normal activities slowly as tolerated.  Call if symptoms persist.      Follow Up     Patient was given instructions and counseling regarding her condition or for health maintenance advice. Please see specific information pulled into the AVS if appropriate.   Any new medication's adverse effects have been discussed in detail with patient  Patient was encouraged to keep me informed of any acute changes, lack of improvement, or any new concerning symptoms.    Return if symptoms worsen or fail to improve.          Dictated Utilizing Dragon Dictation   Please note that portions of this note were completed with a voice recognition program.   Part of this note may be an electronic transcription/translation of spoken language to printed text using the Dragon Dictation System.

## 2022-06-27 ENCOUNTER — TELEPHONE (OUTPATIENT)
Dept: INTERNAL MEDICINE | Facility: CLINIC | Age: 52
End: 2022-06-27

## 2022-07-06 ENCOUNTER — TELEPHONE (OUTPATIENT)
Dept: ORTHOPEDIC SURGERY | Facility: CLINIC | Age: 52
End: 2022-07-06

## 2022-07-06 ENCOUNTER — HOSPITAL ENCOUNTER (OUTPATIENT)
Dept: NUTRITION | Facility: HOSPITAL | Age: 52
Setting detail: RECURRING SERIES
Discharge: HOME | End: 2022-07-06

## 2022-07-06 ENCOUNTER — OFFICE VISIT (OUTPATIENT)
Dept: ORTHOPEDIC SURGERY | Facility: CLINIC | Age: 52
End: 2022-07-06

## 2022-07-06 VITALS — WEIGHT: 293 LBS | HEIGHT: 61 IN | BODY MASS INDEX: 55.32 KG/M2

## 2022-07-06 VITALS
WEIGHT: 293 LBS | DIASTOLIC BLOOD PRESSURE: 84 MMHG | HEIGHT: 61 IN | SYSTOLIC BLOOD PRESSURE: 130 MMHG | BODY MASS INDEX: 55.32 KG/M2

## 2022-07-06 DIAGNOSIS — M25.561 ACUTE PAIN OF RIGHT KNEE: Primary | ICD-10-CM

## 2022-07-06 PROCEDURE — 99204 OFFICE O/P NEW MOD 45 MIN: CPT | Performed by: ORTHOPAEDIC SURGERY

## 2022-07-06 PROCEDURE — 97803 MED NUTRITION INDIV SUBSEQ: CPT

## 2022-07-06 NOTE — TELEPHONE ENCOUNTER
CALLED CS AND SCHEDULED FOR 7/8 @08:00AM AT [facility]. ALSO, CALLED PT AND LVM TO INFORM ABOUT APPT.

## 2022-07-06 NOTE — PROGRESS NOTES
[facility] [place] [facility] Note    Subjective     CC: Pain of the Right Knee      HPI    Jovita Camara is a 51 y.o. female who presents with new problem of: right knee pain.  Onset: mechanical fall. The issue has been ongoing for 3 week(s). Pain is a 4/10 on the pain scale. Pain is described as throbbing and shooting. Associated symptoms include pain, popping and giving way/buckling. The pain is worse with walking, standing, sitting, climbing stairs and rising from seated position; ice and pain medication and/or NSAID improve the pain. Previous treatments have included: bracing, cane/walker and NSAIDS.    I have reviewed the following portions of the patient's history:History of Present Illness and review of systems.    She fell walking her dog.  Injury was a month ago.  She had x-rays [facility].  She works as a  here at [employer].  She is on crutches.  She has knee pain and instability.  She has inability to fully extend the knee.        Review of Systems   Constitutional: Negative.  Negative for chills, fatigue and fever.   HENT: Negative.  Negative for congestion and dental problem.    Eyes: Negative.  Negative for blurred vision.   Respiratory: Negative.  Negative for shortness of breath.    Cardiovascular: Negative.  Negative for leg swelling.   Gastrointestinal: Negative.  Negative for abdominal pain.   Endocrine: Negative.  Negative for polyuria.   Genitourinary: Negative.  Negative for difficulty urinating.   Musculoskeletal: Positive for arthralgias.   Skin: Negative.    Allergic/Immunologic: Negative.    Neurological: Negative.    Hematological: Negative.  Negative for adenopathy.   Psychiatric/Behavioral: Negative.  Negative for behavioral problems.       ROS:    Constiutional:Pt denies fever, chills, nausea, or vomiting.  MSK:as above      Objective      Past Medical History  Past Medical History:   Diagnosis Date   • Allergic    • Anxiety    • Arthritis    • Encephalitis    •  "Headache          Physical Exam  /84   Ht 154.9 cm (60.98\")   Wt (!) 158 kg (348 lb 5.2 oz)   BMI 65.85 kg/m²     Body mass index is 65.85 kg/m².    Patient is well nourished and well developed.        Ortho Exam  She is obese.  Tender medial joint line.  Tender MCL with no laxity.  She lacks 10 degrees of full extension.    Imaging/Labs/EMG Reviewed:  Imaging Results (Last 24 Hours)     ** No results found for the last 24 hours. **        I viewed her radiographs from [facility] which are negative    Assessment:  1. Acute pain of right knee        Plan:  1. Recommend over the counter anti-inflammatories for pain and/or swelling  2. I have ordered an MRI to rule out a meniscus tear and because she is unable to bear weight and has instability  3. I ordered a hinged knee brace for her knee instability  4.  Physical Therapy at [facility]    Follow Up:   Return for After MRI.      Medical Decision Making  Management Options : Moderate - 1 Undiagnosed New Problem with Uncertain Prognosis        Zia Ferraro M.D., [facility]  Orthopedic Surgeon  Fellowship Trained Sports Medicine  [facility]  Orthopedics and Sports Medicine  [address] [place] [address], [address]  [place] [address]    EMR Dragon/Transcription disclaimer:  Much of this encounter note is an electronic transcription of spoken language to printed text. Electronic transcription of spoken language may permit erroneous, or at times, nonsensical words or phrases to be inadvertently transcribed. Although I have reviewed the note for such errors, some may still exist.  "

## 2022-07-08 ENCOUNTER — HOSPITAL ENCOUNTER (OUTPATIENT)
Dept: MRI IMAGING | Facility: HOSPITAL | Age: 52
Discharge: HOME | End: 2022-07-08
Admitting: ORTHOPAEDIC SURGERY

## 2022-07-08 DIAGNOSIS — M25.561 ACUTE PAIN OF RIGHT KNEE: ICD-10-CM

## 2022-07-08 PROCEDURE — 73721 MRI JNT OF LWR EXTRE W/O DYE: CPT

## 2022-07-12 NOTE — TELEPHONE ENCOUNTER
"DR PAGE: RE: MRI VS PHYSICAL THERAPY 07/06/2022 DR PAGE PATIENT_   MRI RESULTS VS PHYSICAL THERAPY.  PATIENT WAS ADVISED F RESULTS & HAS A TEAR.  PATIENT HAS \"PT\" APPTT SCHEDULED FOR IN THE MORNING & IS NEEDING TO KNOW IF SHE STILL NEEDS TO GO OR WAIT UNTIL HER FOLLOW UP, THAT IS SCHEDULED.  PATIENT WAS ADVISED SOMEONE FROM CLINICAL WOULD CALL HER BACK       "

## 2022-07-13 NOTE — TELEPHONE ENCOUNTER
Called patient and told her what Francesca said and she expressed understanding. She will call with any further questions or concerns she may have.  Amina Melchor RT (R), ROT

## 2022-07-18 ENCOUNTER — OFFICE VISIT (OUTPATIENT)
Dept: INTERNAL MEDICINE | Facility: CLINIC | Age: 52
End: 2022-07-18

## 2022-07-18 VITALS
SYSTOLIC BLOOD PRESSURE: 140 MMHG | HEART RATE: 84 BPM | BODY MASS INDEX: 55.32 KG/M2 | DIASTOLIC BLOOD PRESSURE: 70 MMHG | TEMPERATURE: 96.4 F | WEIGHT: 293 LBS | OXYGEN SATURATION: 96 % | HEIGHT: 61 IN

## 2022-07-18 DIAGNOSIS — M15.9 OSTEOARTHRITIS OF MULTIPLE JOINTS, UNSPECIFIED OSTEOARTHRITIS TYPE: ICD-10-CM

## 2022-07-18 DIAGNOSIS — Z00.00 ROUTINE GENERAL MEDICAL EXAMINATION AT A HEALTH CARE FACILITY: Primary | ICD-10-CM

## 2022-07-18 DIAGNOSIS — F41.1 GENERALIZED ANXIETY DISORDER: ICD-10-CM

## 2022-07-18 DIAGNOSIS — S83.206D TEAR OF RIGHT MENISCUS AS CURRENT INJURY, SUBSEQUENT ENCOUNTER: ICD-10-CM

## 2022-07-18 DIAGNOSIS — E66.01 CLASS 3 SEVERE OBESITY DUE TO EXCESS CALORIES WITHOUT SERIOUS COMORBIDITY WITH BODY MASS INDEX (BMI) OF 60.0 TO 69.9 IN ADULT: ICD-10-CM

## 2022-07-18 PROBLEM — S83.206A TEAR OF RIGHT MENISCUS AS CURRENT INJURY: Status: ACTIVE | Noted: 2022-07-18

## 2022-07-18 PROCEDURE — 99396 PREV VISIT EST AGE 40-64: CPT | Performed by: PHYSICIAN ASSISTANT

## 2022-07-18 RX ORDER — ACETAMINOPHEN,DIPHENHYDRAMINE HCL 500; 25 MG/1; MG/1
1 TABLET, FILM COATED ORAL NIGHTLY PRN
COMMUNITY

## 2022-07-18 NOTE — ASSESSMENT & PLAN NOTE
Patient's (Body mass index is 64.35 kg/m².) indicates that they are morbidly obese (BMI > 40 or > 35 with obesity - related health condition) with health conditions that include osteoarthritis . Weight is improving with lifestyle modifications. BMI is is above average; BMI management plan is completed. We discussed low calorie, low carb based diet program, portion control and increasing exercise.  Cont to see nutritionist.

## 2022-07-24 DIAGNOSIS — F41.1 GENERALIZED ANXIETY DISORDER: ICD-10-CM

## 2022-07-26 DIAGNOSIS — F41.1 GENERALIZED ANXIETY DISORDER: ICD-10-CM

## 2022-07-26 RX ORDER — DULOXETIN HYDROCHLORIDE 30 MG/1
30 CAPSULE, DELAYED RELEASE ORAL DAILY
Qty: 90 CAPSULE | Refills: 1 | Status: SHIPPED | OUTPATIENT
Start: 2022-07-26 | End: 2023-01-24 | Stop reason: SDUPTHER

## 2022-07-26 NOTE — TELEPHONE ENCOUNTER
Last appt: 7/18/2022  Next appt: 1/18/2023  Medication: Cymbalta   Last Rx: 1/18/2022 #90 / 1 refill

## 2022-07-26 NOTE — TELEPHONE ENCOUNTER
Caller: Jovita Camara    Relationship: Self    Best call back number: [phone]    Requested Prescriptions:   Requested Prescriptions     Pending Prescriptions Disp Refills   • DULoxetine (CYMBALTA) 30 MG capsule 90 capsule 1     Sig: Take 1 capsule by mouth Daily.        Pharmacy where request should be sent:    [facility]  [phone]    Does the patient have less than a 3 day supply:  [x] Yes  [] No    Nayeli Patel Rep   07/26/22 15:42 EDT

## 2022-07-27 ENCOUNTER — PREP FOR SURGERY (OUTPATIENT)
Dept: OTHER | Facility: HOSPITAL | Age: 52
End: 2022-07-27

## 2022-07-27 ENCOUNTER — OFFICE VISIT (OUTPATIENT)
Dept: ORTHOPEDIC SURGERY | Facility: CLINIC | Age: 52
End: 2022-07-27

## 2022-07-27 VITALS
SYSTOLIC BLOOD PRESSURE: 136 MMHG | DIASTOLIC BLOOD PRESSURE: 78 MMHG | WEIGHT: 293 LBS | HEIGHT: 61 IN | BODY MASS INDEX: 55.32 KG/M2

## 2022-07-27 DIAGNOSIS — S83.231D COMPLEX TEAR OF MEDIAL MENISCUS OF RIGHT KNEE AS CURRENT INJURY, SUBSEQUENT ENCOUNTER: ICD-10-CM

## 2022-07-27 DIAGNOSIS — M25.561 ACUTE PAIN OF RIGHT KNEE: Primary | ICD-10-CM

## 2022-07-27 DIAGNOSIS — S83.231D COMPLEX TEAR OF MEDIAL MENISCUS OF RIGHT KNEE AS CURRENT INJURY, SUBSEQUENT ENCOUNTER: Primary | ICD-10-CM

## 2022-07-27 DIAGNOSIS — E66.01 CLASS 3 SEVERE OBESITY DUE TO EXCESS CALORIES WITHOUT SERIOUS COMORBIDITY WITH BODY MASS INDEX (BMI) OF 60.0 TO 69.9 IN ADULT: ICD-10-CM

## 2022-07-27 PROBLEM — S83.249A ACUTE MEDIAL MENISCUS TEAR: Status: ACTIVE | Noted: 2022-07-27

## 2022-07-27 PROCEDURE — 99214 OFFICE O/P EST MOD 30 MIN: CPT | Performed by: ORTHOPAEDIC SURGERY

## 2022-07-27 RX ORDER — DULOXETIN HYDROCHLORIDE 30 MG/1
30 CAPSULE, DELAYED RELEASE ORAL DAILY
Qty: 90 CAPSULE | Refills: 1 | OUTPATIENT
Start: 2022-07-27

## 2022-07-27 RX ORDER — CEFAZOLIN SODIUM IN 0.9 % NACL 3 G/100 ML
3 INTRAVENOUS SOLUTION, PIGGYBACK (ML) INTRAVENOUS ONCE
Status: CANCELLED | OUTPATIENT
Start: 2022-07-27 | End: 2022-07-27

## 2022-07-27 RX ORDER — ACETAMINOPHEN 500 MG
1000 TABLET ORAL ONCE
Status: CANCELLED | OUTPATIENT
Start: 2022-07-27 | End: 2022-07-27

## 2022-08-10 ENCOUNTER — PREP FOR SURGERY (OUTPATIENT)
Dept: OTHER | Facility: HOSPITAL | Age: 52
End: 2022-08-10

## 2022-08-22 ENCOUNTER — HOSPITAL ENCOUNTER (OUTPATIENT)
Dept: NUTRITION | Facility: HOSPITAL | Age: 52
Setting detail: RECURRING SERIES
Discharge: HOME | End: 2022-08-22

## 2022-08-22 PROCEDURE — 97803 MED NUTRITION INDIV SUBSEQ: CPT

## 2022-08-22 NOTE — PROGRESS NOTES
Nutrition Services    Patient Name:  Jovita Camara  YOB: 1970  MRN: [id number]  Admit Date:  8/22/2022    This medical referred consult was provided via telehealth per patient request. Consent for treatment was given verbally. MARCI spent a total of 30 minutes with patient today.     Patient reports for her follow up today via telephone. She states that she has been working from home now, which has helped cut down on her stress eating that was occurring at work with snacks. She reports at home if she has a snack she will have some PB crackers, protein bar, or mixed nuts. She is drinking plenty of water still. She is still on crutches, her surgery is scheduled for the end of October. She is exerting more energy utilizing crutches. She reports she is eating with smaller plates and forks to help with portion sizes. She had lost 2 more lb at [facility] on 7/27; she had not weighed herself again since. She is motivated to continue with changes at this time. We have scheduled to follow up when she is post-up on 11/15/2022 @ 12:00 PM.     Electronically signed by:  Candida Gomez RD  08/22/22 13:58 EDT

## 2022-10-26 ENCOUNTER — ANESTHESIA EVENT (OUTPATIENT)
Dept: PERIOP | Facility: HOSPITAL | Age: 52
End: 2022-10-26

## 2022-10-26 RX ORDER — FAMOTIDINE 10 MG/ML
20 INJECTION, SOLUTION INTRAVENOUS ONCE
Status: CANCELLED | OUTPATIENT
Start: 2022-10-26 | End: 2022-10-26

## 2022-10-27 ENCOUNTER — HOSPITAL ENCOUNTER (OUTPATIENT)
Facility: HOSPITAL | Age: 52
Discharge: HOME | End: 2022-10-27
Attending: ORTHOPAEDIC SURGERY | Admitting: ORTHOPAEDIC SURGERY

## 2022-10-27 ENCOUNTER — ANESTHESIA (OUTPATIENT)
Dept: PERIOP | Facility: HOSPITAL | Age: 52
End: 2022-10-27

## 2022-10-27 VITALS
WEIGHT: 293 LBS | DIASTOLIC BLOOD PRESSURE: 82 MMHG | HEART RATE: 68 BPM | SYSTOLIC BLOOD PRESSURE: 158 MMHG | HEIGHT: 61 IN | RESPIRATION RATE: 18 BRPM | OXYGEN SATURATION: 92 % | BODY MASS INDEX: 55.32 KG/M2 | TEMPERATURE: 98 F

## 2022-10-27 DIAGNOSIS — S83.241D ACUTE MEDIAL MENISCUS TEAR OF RIGHT KNEE, SUBSEQUENT ENCOUNTER: Primary | ICD-10-CM

## 2022-10-27 DIAGNOSIS — S83.231D COMPLEX TEAR OF MEDIAL MENISCUS OF RIGHT KNEE AS CURRENT INJURY, SUBSEQUENT ENCOUNTER: ICD-10-CM

## 2022-10-27 LAB
B-HCG UR QL: NEGATIVE
EXPIRATION DATE: NORMAL
GLUCOSE BLDC GLUCOMTR-MCNC: 95 MG/DL (ref 70–130)
INTERNAL NEGATIVE CONTROL: NEGATIVE
INTERNAL POSITIVE CONTROL: POSITIVE
Lab: NORMAL

## 2022-10-27 PROCEDURE — S0260 H&P FOR SURGERY: HCPCS | Performed by: PHYSICIAN ASSISTANT

## 2022-10-27 PROCEDURE — 25010000002 PROPOFOL 10 MG/ML EMULSION: Performed by: NURSE ANESTHETIST, CERTIFIED REGISTERED

## 2022-10-27 PROCEDURE — 25010000002 ONDANSETRON PER 1 MG: Performed by: NURSE ANESTHETIST, CERTIFIED REGISTERED

## 2022-10-27 PROCEDURE — 25010000002 FENTANYL CITRATE (PF) 50 MCG/ML SOLUTION: Performed by: NURSE ANESTHETIST, CERTIFIED REGISTERED

## 2022-10-27 PROCEDURE — 25010000002 DEXAMETHASONE PER 1 MG: Performed by: NURSE ANESTHETIST, CERTIFIED REGISTERED

## 2022-10-27 PROCEDURE — 81025 URINE PREGNANCY TEST: CPT | Performed by: ORTHOPAEDIC SURGERY

## 2022-10-27 PROCEDURE — 25010000002 FENTANYL CITRATE (PF) 50 MCG/ML SOLUTION

## 2022-10-27 PROCEDURE — 82962 GLUCOSE BLOOD TEST: CPT

## 2022-10-27 PROCEDURE — 29881 ARTHRS KNE SRG MNISECTMY M/L: CPT | Performed by: ORTHOPAEDIC SURGERY

## 2022-10-27 RX ORDER — FENTANYL CITRATE 50 UG/ML
INJECTION, SOLUTION INTRAMUSCULAR; INTRAVENOUS AS NEEDED
Status: DISCONTINUED | OUTPATIENT
Start: 2022-10-27 | End: 2022-10-27 | Stop reason: SURG

## 2022-10-27 RX ORDER — PROPOFOL 10 MG/ML
VIAL (ML) INTRAVENOUS AS NEEDED
Status: DISCONTINUED | OUTPATIENT
Start: 2022-10-27 | End: 2022-10-27 | Stop reason: SURG

## 2022-10-27 RX ORDER — PROMETHAZINE HYDROCHLORIDE 25 MG/1
25 SUPPOSITORY RECTAL ONCE AS NEEDED
Status: DISCONTINUED | OUTPATIENT
Start: 2022-10-27 | End: 2022-10-27 | Stop reason: HOSPADM

## 2022-10-27 RX ORDER — SODIUM CHLORIDE 0.9 % (FLUSH) 0.9 %
10 SYRINGE (ML) INJECTION EVERY 12 HOURS SCHEDULED
Status: DISCONTINUED | OUTPATIENT
Start: 2022-10-27 | End: 2022-10-27 | Stop reason: HOSPADM

## 2022-10-27 RX ORDER — LABETALOL HYDROCHLORIDE 5 MG/ML
5 INJECTION, SOLUTION INTRAVENOUS
Status: DISCONTINUED | OUTPATIENT
Start: 2022-10-27 | End: 2022-10-27 | Stop reason: HOSPADM

## 2022-10-27 RX ORDER — HYDROCODONE BITARTRATE AND ACETAMINOPHEN 5; 325 MG/1; MG/1
1 TABLET ORAL ONCE AS NEEDED
Status: DISCONTINUED | OUTPATIENT
Start: 2022-10-27 | End: 2022-10-27 | Stop reason: HOSPADM

## 2022-10-27 RX ORDER — DROPERIDOL 2.5 MG/ML
0.62 INJECTION, SOLUTION INTRAMUSCULAR; INTRAVENOUS ONCE AS NEEDED
Status: DISCONTINUED | OUTPATIENT
Start: 2022-10-27 | End: 2022-10-27 | Stop reason: HOSPADM

## 2022-10-27 RX ORDER — SODIUM CHLORIDE 0.9 % (FLUSH) 0.9 %
10 SYRINGE (ML) INJECTION AS NEEDED
Status: DISCONTINUED | OUTPATIENT
Start: 2022-10-27 | End: 2022-10-27 | Stop reason: HOSPADM

## 2022-10-27 RX ORDER — NALOXONE HCL 0.4 MG/ML
0.4 VIAL (ML) INJECTION AS NEEDED
Status: DISCONTINUED | OUTPATIENT
Start: 2022-10-27 | End: 2022-10-27 | Stop reason: HOSPADM

## 2022-10-27 RX ORDER — DROPERIDOL 2.5 MG/ML
0.62 INJECTION, SOLUTION INTRAMUSCULAR; INTRAVENOUS
Status: DISCONTINUED | OUTPATIENT
Start: 2022-10-27 | End: 2022-10-27 | Stop reason: HOSPADM

## 2022-10-27 RX ORDER — BUPIVACAINE HYDROCHLORIDE 2.5 MG/ML
INJECTION, SOLUTION INFILTRATION; PERINEURAL AS NEEDED
Status: DISCONTINUED | OUTPATIENT
Start: 2022-10-27 | End: 2022-10-27 | Stop reason: HOSPADM

## 2022-10-27 RX ORDER — SODIUM CHLORIDE, SODIUM LACTATE, POTASSIUM CHLORIDE, AND CALCIUM CHLORIDE .6; .31; .03; .02 G/100ML; G/100ML; G/100ML; G/100ML
IRRIGANT IRRIGATION AS NEEDED
Status: DISCONTINUED | OUTPATIENT
Start: 2022-10-27 | End: 2022-10-27 | Stop reason: HOSPADM

## 2022-10-27 RX ORDER — IPRATROPIUM BROMIDE AND ALBUTEROL SULFATE 2.5; .5 MG/3ML; MG/3ML
3 SOLUTION RESPIRATORY (INHALATION) ONCE AS NEEDED
Status: DISCONTINUED | OUTPATIENT
Start: 2022-10-27 | End: 2022-10-27 | Stop reason: HOSPADM

## 2022-10-27 RX ORDER — LIDOCAINE HYDROCHLORIDE 10 MG/ML
0.5 INJECTION, SOLUTION EPIDURAL; INFILTRATION; INTRACAUDAL; PERINEURAL ONCE AS NEEDED
Status: COMPLETED | OUTPATIENT
Start: 2022-10-27 | End: 2022-10-27

## 2022-10-27 RX ORDER — FAMOTIDINE 20 MG/1
20 TABLET, FILM COATED ORAL ONCE
Status: COMPLETED | OUTPATIENT
Start: 2022-10-27 | End: 2022-10-27

## 2022-10-27 RX ORDER — LIDOCAINE HYDROCHLORIDE 10 MG/ML
INJECTION, SOLUTION EPIDURAL; INFILTRATION; INTRACAUDAL; PERINEURAL AS NEEDED
Status: DISCONTINUED | OUTPATIENT
Start: 2022-10-27 | End: 2022-10-27 | Stop reason: SURG

## 2022-10-27 RX ORDER — CEFAZOLIN SODIUM IN 0.9 % NACL 3 G/100 ML
3 INTRAVENOUS SOLUTION, PIGGYBACK (ML) INTRAVENOUS ONCE
Status: COMPLETED | OUTPATIENT
Start: 2022-10-27 | End: 2022-10-27

## 2022-10-27 RX ORDER — DEXAMETHASONE SODIUM PHOSPHATE 4 MG/ML
INJECTION, SOLUTION INTRA-ARTICULAR; INTRALESIONAL; INTRAMUSCULAR; INTRAVENOUS; SOFT TISSUE AS NEEDED
Status: DISCONTINUED | OUTPATIENT
Start: 2022-10-27 | End: 2022-10-27 | Stop reason: SURG

## 2022-10-27 RX ORDER — HYDROMORPHONE HYDROCHLORIDE 1 MG/ML
0.5 INJECTION, SOLUTION INTRAMUSCULAR; INTRAVENOUS; SUBCUTANEOUS
Status: DISCONTINUED | OUTPATIENT
Start: 2022-10-27 | End: 2022-10-27 | Stop reason: HOSPADM

## 2022-10-27 RX ORDER — MAGNESIUM HYDROXIDE 1200 MG/15ML
LIQUID ORAL AS NEEDED
Status: DISCONTINUED | OUTPATIENT
Start: 2022-10-27 | End: 2022-10-27 | Stop reason: HOSPADM

## 2022-10-27 RX ORDER — ONDANSETRON 4 MG/1
4 TABLET, FILM COATED ORAL EVERY 8 HOURS PRN
Qty: 10 TABLET | Refills: 0 | Status: SHIPPED | OUTPATIENT
Start: 2022-10-27 | End: 2022-11-04

## 2022-10-27 RX ORDER — SODIUM CHLORIDE 0.9 % (FLUSH) 0.9 %
3-10 SYRINGE (ML) INJECTION AS NEEDED
Status: DISCONTINUED | OUTPATIENT
Start: 2022-10-27 | End: 2022-10-27 | Stop reason: HOSPADM

## 2022-10-27 RX ORDER — SODIUM CHLORIDE 0.9 % (FLUSH) 0.9 %
3 SYRINGE (ML) INJECTION EVERY 12 HOURS SCHEDULED
Status: DISCONTINUED | OUTPATIENT
Start: 2022-10-27 | End: 2022-10-27 | Stop reason: HOSPADM

## 2022-10-27 RX ORDER — SODIUM CHLORIDE, SODIUM LACTATE, POTASSIUM CHLORIDE, CALCIUM CHLORIDE 600; 310; 30; 20 MG/100ML; MG/100ML; MG/100ML; MG/100ML
9 INJECTION, SOLUTION INTRAVENOUS CONTINUOUS
Status: DISCONTINUED | OUTPATIENT
Start: 2022-10-27 | End: 2022-10-27 | Stop reason: HOSPADM

## 2022-10-27 RX ORDER — PROMETHAZINE HYDROCHLORIDE 25 MG/1
25 TABLET ORAL ONCE AS NEEDED
Status: DISCONTINUED | OUTPATIENT
Start: 2022-10-27 | End: 2022-10-27 | Stop reason: HOSPADM

## 2022-10-27 RX ORDER — OXYCODONE HYDROCHLORIDE AND ACETAMINOPHEN 5; 325 MG/1; MG/1
1 TABLET ORAL EVERY 6 HOURS PRN
Qty: 30 TABLET | Refills: 0 | Status: SHIPPED | OUTPATIENT
Start: 2022-10-27 | End: 2022-11-04

## 2022-10-27 RX ORDER — ROCURONIUM BROMIDE 10 MG/ML
INJECTION, SOLUTION INTRAVENOUS AS NEEDED
Status: DISCONTINUED | OUTPATIENT
Start: 2022-10-27 | End: 2022-10-27 | Stop reason: SURG

## 2022-10-27 RX ORDER — ACETAMINOPHEN 500 MG
1000 TABLET ORAL ONCE
Status: COMPLETED | OUTPATIENT
Start: 2022-10-27 | End: 2022-10-27

## 2022-10-27 RX ORDER — FENTANYL CITRATE 50 UG/ML
INJECTION, SOLUTION INTRAMUSCULAR; INTRAVENOUS
Status: COMPLETED
Start: 2022-10-27 | End: 2022-10-27

## 2022-10-27 RX ORDER — FENTANYL CITRATE 50 UG/ML
50 INJECTION, SOLUTION INTRAMUSCULAR; INTRAVENOUS
Status: DISCONTINUED | OUTPATIENT
Start: 2022-10-27 | End: 2022-10-27 | Stop reason: HOSPADM

## 2022-10-27 RX ORDER — HYDRALAZINE HYDROCHLORIDE 20 MG/ML
5 INJECTION INTRAMUSCULAR; INTRAVENOUS
Status: DISCONTINUED | OUTPATIENT
Start: 2022-10-27 | End: 2022-10-27 | Stop reason: HOSPADM

## 2022-10-27 RX ORDER — ONDANSETRON 2 MG/ML
4 INJECTION INTRAMUSCULAR; INTRAVENOUS ONCE AS NEEDED
Status: DISCONTINUED | OUTPATIENT
Start: 2022-10-27 | End: 2022-10-27 | Stop reason: HOSPADM

## 2022-10-27 RX ORDER — ONDANSETRON 2 MG/ML
INJECTION INTRAMUSCULAR; INTRAVENOUS AS NEEDED
Status: DISCONTINUED | OUTPATIENT
Start: 2022-10-27 | End: 2022-10-27 | Stop reason: SURG

## 2022-10-27 RX ORDER — MEPERIDINE HYDROCHLORIDE 25 MG/ML
12.5 INJECTION INTRAMUSCULAR; INTRAVENOUS; SUBCUTANEOUS
Status: DISCONTINUED | OUTPATIENT
Start: 2022-10-27 | End: 2022-10-27 | Stop reason: HOSPADM

## 2022-10-27 RX ORDER — MIDAZOLAM HYDROCHLORIDE 1 MG/ML
1 INJECTION INTRAMUSCULAR; INTRAVENOUS
Status: DISCONTINUED | OUTPATIENT
Start: 2022-10-27 | End: 2022-10-27 | Stop reason: HOSPADM

## 2022-10-27 RX ADMIN — LIDOCAINE HYDROCHLORIDE 0.5 ML: 10 INJECTION, SOLUTION EPIDURAL; INFILTRATION; INTRACAUDAL; PERINEURAL at 06:32

## 2022-10-27 RX ADMIN — FENTANYL CITRATE 100 MCG: 50 INJECTION, SOLUTION INTRAMUSCULAR; INTRAVENOUS at 07:42

## 2022-10-27 RX ADMIN — SODIUM CHLORIDE, POTASSIUM CHLORIDE, SODIUM LACTATE AND CALCIUM CHLORIDE 9 ML/HR: 600; 310; 30; 20 INJECTION, SOLUTION INTRAVENOUS at 06:32

## 2022-10-27 RX ADMIN — PROPOFOL 200 MG: 10 INJECTION, EMULSION INTRAVENOUS at 07:42

## 2022-10-27 RX ADMIN — SUGAMMADEX 300 MG: 100 INJECTION, SOLUTION INTRAVENOUS at 08:08

## 2022-10-27 RX ADMIN — Medication 2 G: at 07:46

## 2022-10-27 RX ADMIN — ONDANSETRON 4 MG: 2 INJECTION INTRAMUSCULAR; INTRAVENOUS at 07:45

## 2022-10-27 RX ADMIN — DEXAMETHASONE SODIUM PHOSPHATE 8 MG: 4 INJECTION, SOLUTION INTRA-ARTICULAR; INTRALESIONAL; INTRAMUSCULAR; INTRAVENOUS; SOFT TISSUE at 07:45

## 2022-10-27 RX ADMIN — FAMOTIDINE 20 MG: 20 TABLET ORAL at 06:32

## 2022-10-27 RX ADMIN — FENTANYL CITRATE 50 MCG: 50 INJECTION, SOLUTION INTRAMUSCULAR; INTRAVENOUS at 08:46

## 2022-10-27 RX ADMIN — LIDOCAINE HYDROCHLORIDE 50 MG: 10 INJECTION, SOLUTION EPIDURAL; INFILTRATION; INTRACAUDAL; PERINEURAL at 07:42

## 2022-10-27 RX ADMIN — ACETAMINOPHEN 1000 MG: 500 TABLET ORAL at 06:32

## 2022-10-27 RX ADMIN — ROCURONIUM BROMIDE 50 MG: 10 INJECTION, SOLUTION INTRAVENOUS at 07:42

## 2022-10-27 NOTE — ANESTHESIA POSTPROCEDURE EVALUATION
Patient: Jovita Camara    Procedure Summary     Date: 10/27/22 Room / Location:  [facility]  [facility]    Anesthesia Start: 0732 Anesthesia Stop: 0824    Procedure: RIGHT KNEE ARTHROSCOPY WITH PARTIAL MEDIAL MENISCECTOMY (Right: Knee) Diagnosis:       Complex tear of medial meniscus of right knee as current injury, subsequent encounter      (Complex tear of medial meniscus of right knee as current injury, subsequent encounter [S83.231D])    Surgeons: Zia Ferraro MD Provider: Annia Fall MD    Anesthesia Type: general ASA Status: 3          Anesthesia Type: general    Vitals  HR 73  /95  T 98.0  SpO2 96% 2L NC  RR 14          Post Anesthesia Care and Evaluation    Patient location during evaluation: PACU  Patient participation: waiting for patient participation  Level of consciousness: sleepy but conscious  Pain score: 0  Pain management: adequate    Airway patency: patent  Anesthetic complications: No anesthetic complications  PONV Status: none  Cardiovascular status: hemodynamically stable and acceptable  Respiratory status: nonlabored ventilation, acceptable and nasal cannula  Hydration status: acceptable

## 2022-10-27 NOTE — ANESTHESIA PREPROCEDURE EVALUATION
Anesthesia Evaluation     Patient summary reviewed and Nursing notes reviewed   no history of anesthetic complications:  NPO Solid Status: > 8 hours  NPO Liquid Status: > 8 hours           Airway   Mallampati: II  TM distance: >3 FB  Neck ROM: full  Possible difficult intubation  Dental - normal exam     Pulmonary - normal exam   (+) a smoker Former,   Cardiovascular - normal exam    ECG reviewed    (+) BAILEY,   (-) angina      Neuro/Psych  (+) headaches, tremors, psychiatric history Anxiety and Depression,    GI/Hepatic/Renal/Endo    (+) morbid obesity,    (-) GERD, liver disease, no renal disease, diabetes, no thyroid disorder    Musculoskeletal     Abdominal    Substance History      OB/GYN          Other   arthritis,                      Anesthesia Plan    ASA 3     general     intravenous induction     Anesthetic plan, risks, benefits, and alternatives have been provided, discussed and informed consent has been obtained with: patient.    Plan discussed with CRNA.        CODE STATUS:

## 2022-10-27 NOTE — ANESTHESIA PROCEDURE NOTES
Airway  Urgency: elective    Airway not difficult    General Information and Staff    Patient location during procedure: OR  Anesthesiologist: Annia Fall MD  CRNA/CAA: Milady Persaud CRNA    Indications and Patient Condition  Indications for airway management: airway protection    Preoxygenated: yes  MILS not maintained throughout  Mask difficulty assessment: 2 - vent by mask + OA or adjuvant +/- NMBA    Final Airway Details  Final airway type: endotracheal airway      Successful airway: ETT  Cuffed: yes   Successful intubation technique: direct laryngoscopy  Endotracheal tube insertion site: oral  Blade: Stewart  Blade size: 3  ETT size (mm): 7.5  Cormack-Lehane Classification: grade I - full view of glottis  Placement verified by: chest auscultation and capnometry   Measured from: lips  ETT/EBT  to lips (cm): 20  Number of attempts at approach: 1  Assessment: lips, teeth, and gum same as pre-op and atraumatic intubation    Additional Comments  Negative epigastric sounds, Breath sound equal bilaterally with symmetric chest rise and fall

## 2022-10-30 NOTE — TELEPHONE ENCOUNTER
Hub staff attempted to follow warm transfer process and was unsuccessful     Caller: Jovita Camara    Relationship to patient: Self    Best call back number: [phone]    Patient is needing: PATIENT STATES THAT SHE SAW ARTUR ABEL LAST WEEK FOR KNEE PAIN.  IT IS SIGNIFICANTLY WORSE TODAY TO WHERE SHE CAN HARDLY WALK ON IT.  CAN SHE SEE ANYONE TODAY?          
S/W pt to advise her to go to [facility]. She states she just left [facility]. I scheduled her for Friday July 1 at 3:45, in case she is not feeling better. She states she will call back on Friday and cancel if she does not need it.   
no

## 2022-11-04 ENCOUNTER — OFFICE VISIT (OUTPATIENT)
Dept: ORTHOPEDIC SURGERY | Facility: CLINIC | Age: 52
End: 2022-11-04

## 2022-11-04 ENCOUNTER — HOSPITAL ENCOUNTER (OUTPATIENT)
Dept: PHYSICAL THERAPY | Facility: HOSPITAL | Age: 52
Setting detail: THERAPIES SERIES
Discharge: HOME | End: 2022-11-04

## 2022-11-04 VITALS — TEMPERATURE: 97.1 F

## 2022-11-04 DIAGNOSIS — Z98.890 STATUS POST ARTHROSCOPY OF RIGHT KNEE: Primary | ICD-10-CM

## 2022-11-04 DIAGNOSIS — S83.231D COMPLEX TEAR OF MEDIAL MENISCUS OF RIGHT KNEE AS CURRENT INJURY, SUBSEQUENT ENCOUNTER: ICD-10-CM

## 2022-11-04 DIAGNOSIS — E66.01 CLASS 3 SEVERE OBESITY DUE TO EXCESS CALORIES WITHOUT SERIOUS COMORBIDITY WITH BODY MASS INDEX (BMI) OF 60.0 TO 69.9 IN ADULT: ICD-10-CM

## 2022-11-04 PROCEDURE — 99024 POSTOP FOLLOW-UP VISIT: CPT | Performed by: ORTHOPAEDIC SURGERY

## 2022-11-04 PROCEDURE — 97161 PT EVAL LOW COMPLEX 20 MIN: CPT

## 2022-11-04 NOTE — PROGRESS NOTES
Chief Complaint   Patient presents with   • Post-op     1 week status post RIGHT KNEE ARTHROSCOPY WITH PARTIAL MEDIAL MENISCECTOMY      HPI    She is doing well.  She did slip and fall once in her kitchen but her knee feels better.    Vitals:    11/04/22 0819   Temp: 97.1 °F (36.2 °C)         Physical Exam:  Right knee portals look good.  1 stitch had to be removed.  Negative Homans' sign.        ICD-10-CM ICD-9-CM   1. Status post arthroscopy of right knee  Z98.890 V45.89   2. Complex tear of medial meniscus of right knee as current injury, subsequent encounter  S83.231D V58.89   3. Class 3 severe obesity due to excess calories without serious comorbidity with body mass index (BMI) of 60.0 to 69.9 in adult (HCC)  E66.01 278.01    Z68.44 V85.44     She will go to physical therapy next-door.  Follow-up in 3 weeks.  She works from home.        Zia Ferraro M.D., [facility]  Orthopedic Surgeon  Fellowship Trained Sports Medicine  [facility]  Orthopedics and Sports Medicine  [address] [place] [address], [address]  [place] [address]      EMR Dragon/Transcription disclaimer:  Much of this encounter note is an electronic transcription of spoken language to printed text. Electronic transcription of spoken language may permit erroneous, or at times, nonsensical words or phrases to be inadvertently transcribed. Although I have reviewed the note for such errors, some may still exist.

## 2022-11-04 NOTE — THERAPY EVALUATION
Outpatient Physical Therapy Ortho Initial Evaluation   [place]     Patient Name: Jovita Camara  : 1970  MRN: [id number]  Today's Date: 2022      Visit Date: 2022    Patient Active Problem List   Diagnosis   • Tremor   • Numbness and tingling in both hands   • Osteoarthritis of multiple joints   • Class 3 severe obesity due to excess calories without serious comorbidity with body mass index (BMI) of 60.0 to 69.9 in adult (HCC)   • Generalized anxiety disorder   • Tear of right meniscus as current injury   • Acute medial meniscus tear        Past Medical History:   Diagnosis Date   • Allergic    • Anxiety    • Arthritis    • Arthritis of back    • Encephalitis    • Headache    • Hip arthrosis    • Knee swelling    • Tear of meniscus of knee    • Torn meniscus 2022   • Tremors of nervous system     essential tremors        Past Surgical History:   Procedure Laterality Date   • ADENOIDECTOMY     • BILATERAL INSERTION OF EAR TUBES AND ADENOIDECTOMY     • KNEE ARTHROSCOPY Right 10/27/2022    Procedure: KNEE ARTHROSCOPY WITH PARTIAL MEDIAL MENISCECTOMY RIGHT;  Surgeon: Zia Ferraro MD;  Location: [facility];  Service: Orthopedics;  Laterality: Right;   • TONSILLECTOMY         Visit Dx:     ICD-10-CM ICD-9-CM   1. Status post arthroscopy of right knee  Z98.890 V45.89   2. Complex tear of medial meniscus of right knee as current injury, subsequent encounter  S83.231D V58.89          Patient History     Row Name 22 1345             History    Chief Complaint Difficulty Walking;Difficulty with daily activities;Joint stiffness;Muscle weakness;Pain  -      Type of Pain Knee pain  -      Brief Description of Current Complaint Patient fell several months ago and has recently undergone surgery at right knee. She is ambulating on crutches wit hantalgic gait. She is hoping to return to previous level of function.  -      Patient/Caregiver Goals Relieve pain;Return to prior level of  function;Return to work;Improve mobility;Improve strength;Know what to do to help the symptoms  -      Occupation/sports/leisure activities billing and code specialist  -      What clinical tests have you had for this problem? MRI  -      Results of Clinical Tests torn meniscus medial  -         Pain     Pain Location Knee  -      Pain at Present 4  -      Pain at Best 3  -      Pain at Worst 6  -JH      Pain Frequency Several days a week  -      What Performance Factors Make the Current Problem(s) WORSE? walking, stairs, squatting, lifting  -      What Performance Factors Make the Current Problem(s) BETTER? gentle movements, sitting, rest  -      Difficulties at work? yes  -JH      Difficulties with ADL's? yes  -JH      Difficulties with recreational activities? yes  -         Fall Risk Assessment    Any falls in the past year: Yes  -      Factors that contributed to the fall: Lost balance  -         Daily Activities    Primary Language English  -      Pt Participated in POC and Goals Yes  -            User Key  (r) = Recorded By, (t) = Taken By, (c) = Cosigned By    Initials Name Provider Type     Alejandro Lucia, PT Physical Therapist                 PT Ortho     Row Name 11/04/22 1268       Precautions and Contraindications    Precautions/Limitations fall precautions  knee precautions  -       Subjective Pain    Able to rate subjective pain? yes  -       Posture/Observations    Posture/Observations Comments 2 small incision righ anterior knee with closed wound healing, mild swelling, no redness or warmth. Ambulating with 1 - 2 crutches.  -       General ROM    RT Lower Ext Rt Knee Extension/Flexion  -       Right Lower Ext    Rt Knee Extension/Flexion AROM 5-85  -JH    Rt Knee Extension/Flexion PROM 3-90  -       MMT (Manual Muscle Testing)    General MMT Comments Unable to perform SLR RLE, able to perform LAQ, 3+/5 quad, 4-/5 DF and PF  -       Sensation    Sensation  WNL? WFL  -          User Key  (r) = Recorded By, (t) = Taken By, (c) = Cosigned By    Initials Name Provider Type    Alejandro Cespedes, PT Physical Therapist                            Therapy Education  Education Details: HEP included: knee ext passive hangs, calf/hamstring stretch, patellar mobility, LAQ, SLR, calf raises. Education on gait training with crutches.  Given: HEP, Symptoms/condition management, Pain management, Posture/body mechanics, Mobility training, Fall prevention and home safety  Program: New  How Provided: Verbal, Demonstration, Written  Provided to: Patient  Level of Understanding: Teach back education performed, Verbalized, Demonstrated      PT OP Goals     Row Name 11/04/22 1345          PT Short Term Goals    STG Date to Achieve 11/25/22  -     STG 1 Compliant with HEP for optimal outcomes  -     STG 1 [facility]     STG 2 LEFS 50 or greater to indicate improved function  -     STG 2 [facility]     STG 3 Patient will be able to ambulate with no AD with pain less than 1/10  -     STG 3 [facility]        Long Term Goals    LTG Date to Achieve 12/16/22  -     LTG 1 LEFS 60 or more to indicate improved function  -     LTG 1 [facility]     LTG 2 Pt will be able to navigate stairs with pain less than 1/10  -     LTG 2 [facility]     LTG 3 Patient will be able to demonstrate ability to perform 15 SLR without rest break an no ext lag to indicate improved quad function and strength  -     LTG 3 [facility]        Time Calculation    PT Goal Re-Cert Due Date 02/02/23  [facility]           User Key  (r) = Recorded By, (t) = Taken By, (c) = Cosigned By    Initials Name Provider Type    Alejandro Cespedes, PT Physical Therapist                 PT Assessment/Plan     Row Name 11/04/22 7077          PT Assessment    Functional Limitations Impaired gait;Impaired locomotion;Limitation in home management;Performance in self-care ADL;Performance in leisure  activities;Performance in work activities;Limitations in community activities  -     Impairments Balance;Endurance;Gait;Range of motion;Pain;Muscle strength;Joint mobility;Joint integrity;Motor function  -     Assessment Comments Patient presents s/p Right KNEE ARTHROSCOPY PARTIAL MEDIAL MENISCECTOMY on 10/27/22. Patient is currently WBAT on RLE and ambulating with bilateral axillary crutches for community mobility. She is able to ambulate with antalgic gait pattern and slow gait speeds without AD for short distances only. Skilled physical therapy services warranted to address improvement upon listed deficits, meet patient goals, and maximize function.  -     Please refer to paper survey for additional self-reported information Yes  -     Rehab Potential Good  -     Patient/caregiver participated in establishment of treatment plan and goals Yes  -     Patient would benefit from skilled therapy intervention Yes  -        PT Plan    PT Frequency 2x/week  -     Predicted Duration of Therapy Intervention (PT) 20 visits  -     Planned CPT's? PT EVAL LOW COMPLEXITY: 73877;PT THER PROC EA 15 MIN: 19431;PT THER ACT EA 15 MIN: 86576;PT MANUAL THERAPY EA 15 MIN: 39567;PT NEUROMUSC RE-EDUCATION EA 15 MIN: 32318;PT GAIT TRAINING EA 15 MIN: 10611  -     PT Plan Comments Plan to address patient’s impairments and limitations with skilled PT interventions focusing on improving functional mobility for improved ability to perform daily activities. Follow medial meniscus repair protocol.  -           User Key  (r) = Recorded By, (t) = Taken By, (c) = Cosigned By    Initials Name Provider Type    Alejandro Cespedes, PT Physical Therapist                   OP Exercises     Row Name 11/04/22 1345             Subjective Pain    Able to rate subjective pain? yes  -            User Key  (r) = Recorded By, (t) = Taken By, (c) = Cosigned By    Initials Name Provider Type    Alejandro Cespedes, PT Physical Therapist                               Outcome Measure Options: Lower Extremity Functional Scale (LEFS)  Lower Extremity Functional Index  Any of your usual work, housework or school activities: Moderate difficulty  Your usual hobbies, recreational or sporting activities: Moderate difficulty  Getting into or out of the bath: A little bit of difficulty  Walking between rooms: A little bit of difficulty  Putting on your shoes or socks: A little bit of difficulty  Squatting: Quite a bit of difficulty  Lifting an object, like a bag of groceries from the floor: A little bit of difficulty  Performing light activities around your home: A little bit of difficulty  Performing heavy activities around your home: Quite a bit of difficulty  Getting into or out of a car: A little bit of difficulty  Walking 2 blocks: Quite a bit of difficulty  Walking a mile: Quite a bit of difficulty  Going up or down 10 stairs (about 1 flight of stairs): Moderate difficulty  Standing for 1 hour: Quite a bit of difficulty  Sitting for 1 hour: A little bit of difficulty  Running on even ground: Extreme difficulty or unable to perform activity  Running on uneven ground: Extreme difficulty or unable to perform activity  Making sharp turns while running fast: Extreme difficulty or unable to perform activity  Hopping: Extreme difficulty or unable to perform activity  Rolling over in bed: A little bit of difficulty  Total: 35      Time Calculation:     Start Time: 1345  Untimed Charges  PT Eval/Re-eval Minutes: 60  Total Minutes  Untimed Charges Total Minutes: 60   Total Minutes: 60     Therapy Charges for Today     Code Description Service Date Service Provider Modifiers Qty    [id number] HC PT EVAL LOW COMPLEXITY 4 11/4/2022 Alejandro Lucia, PT GP 1          PT G-Codes  Outcome Measure Options: Lower Extremity Functional Scale (LEFS)  Total: 35         Alejandro Lucia PT  11/4/2022

## 2022-11-09 ENCOUNTER — HOSPITAL ENCOUNTER (OUTPATIENT)
Dept: PHYSICAL THERAPY | Facility: HOSPITAL | Age: 52
Setting detail: THERAPIES SERIES
Discharge: HOME | End: 2022-11-09

## 2022-11-09 DIAGNOSIS — S83.231D COMPLEX TEAR OF MEDIAL MENISCUS OF RIGHT KNEE AS CURRENT INJURY, SUBSEQUENT ENCOUNTER: ICD-10-CM

## 2022-11-09 DIAGNOSIS — Z98.890 STATUS POST ARTHROSCOPY OF RIGHT KNEE: Primary | ICD-10-CM

## 2022-11-09 PROCEDURE — 97110 THERAPEUTIC EXERCISES: CPT

## 2022-11-09 PROCEDURE — 97140 MANUAL THERAPY 1/> REGIONS: CPT

## 2022-11-09 NOTE — THERAPY TREATMENT NOTE
Outpatient Physical Therapy Ortho Treatment Note   [place]     Patient Name: Jovita Camara  : 1970  MRN: [id number]  Today's Date: 2022      Visit Date: 2022    Visit Dx:    ICD-10-CM ICD-9-CM   1. Status post arthroscopy of right knee  Z98.890 V45.89   2. Complex tear of medial meniscus of right knee as current injury, subsequent encounter  S83.231D V58.89       Patient Active Problem List   Diagnosis   • Tremor   • Numbness and tingling in both hands   • Osteoarthritis of multiple joints   • Class 3 severe obesity due to excess calories without serious comorbidity with body mass index (BMI) of 60.0 to 69.9 in adult (HCC)   • Generalized anxiety disorder   • Tear of right meniscus as current injury   • Acute medial meniscus tear        Past Medical History:   Diagnosis Date   • Allergic    • Anxiety    • Arthritis    • Arthritis of back    • Encephalitis    • Headache    • Hip arthrosis    • Knee swelling    • Tear of meniscus of knee    • Torn meniscus 2022   • Tremors of nervous system     essential tremors        Past Surgical History:   Procedure Laterality Date   • ADENOIDECTOMY     • BILATERAL INSERTION OF EAR TUBES AND ADENOIDECTOMY     • KNEE ARTHROSCOPY Right 10/27/2022    Procedure: KNEE ARTHROSCOPY WITH PARTIAL MEDIAL MENISCECTOMY RIGHT;  Surgeon: Zia Ferraro MD;  Location: [facility];  Service: Orthopedics;  Laterality: Right;   • TONSILLECTOMY                          PT Assessment/Plan     Row Name 22 1100          PT Assessment    Assessment Comments Patient progressing well with overall ROM. She has full extension and progressing with knee flexion as tolerated and able to perform AROM past 90 degrees. She is showing good ambulatory skills with a cane. Quad function is improving and now able to perform SLR with mild lag.  -        PT Plan    PT Plan Comments Cont per [facility]  -           User Key  (r) = Recorded By, (t) = Taken By, (c) = Cosigned By     Initials Name Provider Type     Alejandro Lucia, PT Physical Therapist                   OP Exercises     Row Name 11/09/22 1100             Subjective Comments    Subjective Comments Patient reports her knee will sometimes feel like it will give way.  -         Subjective Pain    Able to rate subjective pain? yes  -      Pre-Treatment Pain Level 2  -      Post-Treatment Pain Level 2  -         Total Minutes    22588 - PT Therapeutic Exercise Minutes 33  -JH      09701 - PT Manual Therapy Minutes 5  -JH         Exercise 1    Exercise Name 1 Bridges  -      Sets 1 2  -      Reps 1 10  -         Exercise 2    Exercise Name 2 Clamshells  -      Reps 2 10  -JH         Exercise 3    Exercise Name 3 LAQ  -      Sets 3 2  -JH      Reps 3 10  -JH         Exercise 4    Exercise Name 4 Ball SKTC  -      Sets 4 2  -      Reps 4 10  -         Exercise 5    Exercise Name 5 STS from table  -      Sets 5 3  -      Reps 5 10  -JH         Exercise 6    Exercise Name 6 SAQ  -      Sets 6 2  -      Reps 6 10  -JH         Exercise 7    Exercise Name 7 Passive knee extension  -      Time 7 2 min  -         Exercise 8    Exercise Name 8 Gait with cane  -      Time 8 4 min  -            User Key  (r) = Recorded By, (t) = Taken By, (c) = Cosigned By    Initials Name Provider Type     Alejandro Lucia PT Physical Therapist                         Manual Rx (last 36 hours)     Manual Treatments     Row Name 11/09/22 1100             Total Minutes    07556 - PT Manual Therapy Minutes 5  -         Manual Rx 1    Manual Rx 1 Location Right knee  -      Manual Rx 1 Type Patellar mobs all directions  -      Manual Rx 1 Duration 5 min  -            User Key  (r) = Recorded By, (t) = Taken By, (c) = Cosigned By    Initials Name Provider Type     Alejandro Lucia PT Physical Therapist                                   Time Calculation:   Start Time: 1100  Timed Charges  76806 - PT Therapeutic  Exercise Minutes: 33  42235 - PT Manual Therapy Minutes: 5  Total Minutes  Timed Charges Total Minutes: 38   Total Minutes: 38  Therapy Charges for Today     Code Description Service Date Service Provider Modifiers Qty    49455052745  PT THER PROC EA 15 MIN 11/9/2022 Alejandro Lucia, PT GP 2    [id number]  PT MANUAL THERAPY EA 15 MIN 11/9/2022 Alejandro Lucia, PT GP 1                    Alejandro Lucia, PT  11/9/2022

## 2022-11-15 ENCOUNTER — HOSPITAL ENCOUNTER (OUTPATIENT)
Dept: PHYSICAL THERAPY | Facility: HOSPITAL | Age: 52
Setting detail: THERAPIES SERIES
Discharge: HOME | End: 2022-11-15

## 2022-11-15 ENCOUNTER — HOSPITAL ENCOUNTER (OUTPATIENT)
Dept: NUTRITION | Facility: HOSPITAL | Age: 52
Setting detail: RECURRING SERIES
Discharge: HOME | End: 2022-11-15

## 2022-11-15 DIAGNOSIS — S83.231D COMPLEX TEAR OF MEDIAL MENISCUS OF RIGHT KNEE AS CURRENT INJURY, SUBSEQUENT ENCOUNTER: ICD-10-CM

## 2022-11-15 DIAGNOSIS — Z98.890 STATUS POST ARTHROSCOPY OF RIGHT KNEE: Primary | ICD-10-CM

## 2022-11-15 PROCEDURE — 97116 GAIT TRAINING THERAPY: CPT

## 2022-11-15 PROCEDURE — 97803 MED NUTRITION INDIV SUBSEQ: CPT

## 2022-11-15 PROCEDURE — 97110 THERAPEUTIC EXERCISES: CPT

## 2022-11-15 NOTE — THERAPY TREATMENT NOTE
Outpatient Physical Therapy Ortho Treatment Note   [place]     Patient Name: Jovita Camara  : 1970  MRN: [id number]  Today's Date: 11/15/2022      Visit Date: 11/15/2022    Visit Dx:    ICD-10-CM ICD-9-CM   1. Status post arthroscopy of right knee  Z98.890 V45.89   2. Complex tear of medial meniscus of right knee as current injury, subsequent encounter  S83.231D V58.89       Patient Active Problem List   Diagnosis   • Tremor   • Numbness and tingling in both hands   • Osteoarthritis of multiple joints   • Class 3 severe obesity due to excess calories without serious comorbidity with body mass index (BMI) of 60.0 to 69.9 in adult (HCC)   • Generalized anxiety disorder   • Tear of right meniscus as current injury   • Acute medial meniscus tear        Past Medical History:   Diagnosis Date   • Allergic    • Anxiety    • Arthritis    • Arthritis of back    • Encephalitis    • Headache    • Hip arthrosis    • Knee swelling    • Tear of meniscus of knee    • Torn meniscus 2022   • Tremors of nervous system     essential tremors        Past Surgical History:   Procedure Laterality Date   • ADENOIDECTOMY     • BILATERAL INSERTION OF EAR TUBES AND ADENOIDECTOMY     • KNEE ARTHROSCOPY Right 10/27/2022    Procedure: KNEE ARTHROSCOPY WITH PARTIAL MEDIAL MENISCECTOMY RIGHT;  Surgeon: Zia Ferraro MD;  Location: [facility];  Service: Orthopedics;  Laterality: Right;   • TONSILLECTOMY          PT Ortho     Row Name 11/15/22 1015       Subjective Comments    Subjective Comments Patient reports she was able to put her foot over her knee for the first time.  -       Subjective Pain    Able to rate subjective pain? yes  -    Pre-Treatment Pain Level 1  -    Post-Treatment Pain Level 1  -       General ROM    GENERAL ROM COMMENTS Left knee 0-115  -JH       Right Lower Ext    Rt Knee Extension/Flexion AROM 0-109  -JH          User Key  (r) = Recorded By, (t) = Taken By, (c) = Cosigned By    Initials Name  Provider Type    Alejandro Cespedes, PT Physical Therapist                             PT Assessment/Plan     Row Name 11/15/22 1015          PT Assessment    Assessment Comments Patient able to perform 109 degrees PROM of right knee nearing full flexion. Progressed with improved transfer efficiency and working on normalizing gait pattern without cane in parallel bars with mirror for visual cues.  -        PT Plan    PT Plan Comments Cont per POC  -           User Key  (r) = Recorded By, (t) = Taken By, (c) = Cosigned By    Initials Name Provider Type    Alejandro Cespedes, PT Physical Therapist                   OP Exercises     Row Name 11/15/22 1015             Subjective Comments    Subjective Comments Patient reports she was able to put her foot over her knee for the first time.  -         Subjective Pain    Able to rate subjective pain? yes  -      Pre-Treatment Pain Level 1  -      Post-Treatment Pain Level 1  -         Total Minutes    64061 - Gait Training Minutes  10  -      99760 - PT Therapeutic Exercise Minutes 24  -      22937 - PT Manual Therapy Minutes 6  -         Exercise 1    Exercise Name 1 NuStep L6  -      Sets 1 2  -      Reps 1 10  -      Time 1 5 min  -         Exercise 2    Exercise Name 2 Clamshells yellow TB  -      Sets 2 2  -      Reps 2 10  -         Exercise 3    Exercise Name 3 LAQ  -      Sets 3 2  -      Reps 3 10  -         Exercise 4    Exercise Name 4 Heel Slides  -      Sets 4 2  -      Reps 4 10  -         Exercise 5    Exercise Name 5 STS from table  -      Sets 5 3  -      Reps 5 10  -         Exercise 6    Exercise Name 6 SLR  -      Sets 6 2  -      Reps 6 8 and 5  -         Exercise 7    Exercise Name 7 Bridges  -      Sets 7 2  -      Reps 7 10  -         Exercise 8    Exercise Name 8 Gait in parallel bars with verbal cues for improved heel toe, knee extension at terminal swing and midstance, and working on  upright posture without lateral lean.  -JH      Time 8 10 min  -            User Key  (r) = Recorded By, (t) = Taken By, (c) = Cosigned By    Initials Name Provider Type    Alejandro Cespedes PT Physical Therapist                         Manual Rx (last 36 hours)     Manual Treatments     Row Name 11/15/22 1015             Total Minutes    87788 - PT Manual Therapy Minutes 6  -JH         Manual Rx 1    Manual Rx 1 Location Right knee  -JH      Manual Rx 1 Type PROM flex/ext  -JH      Manual Rx 1 Duration 6 min  -            User Key  (r) = Recorded By, (t) = Taken By, (c) = Cosigned By    Initials Name Provider Type    Alejandro Cespedes PT Physical Therapist                                   Time Calculation:   Start Time: 1015  Timed Charges  57483 - PT Therapeutic Exercise Minutes: 24  79049 - Gait Training Minutes : 10  99718 - PT Manual Therapy Minutes: 6  Total Minutes  Timed Charges Total Minutes: 40   Total Minutes: 40  Therapy Charges for Today     Code Description Service Date Service Provider Modifiers Qty    45248555746 HC PT THER PROC EA 15 MIN 11/15/2022 Alejandro Lucia, PT GP 2    43891306569 HC GAIT TRAINING EA 15 MIN 11/15/2022 Alejandro Lucia, PT GP 1                    Alejandro Lucia PT  11/15/2022

## 2022-11-15 NOTE — PROGRESS NOTES
Nutrition Services    Patient Name:  Jovita Camara  YOB: 1970  MRN: [id number]  Admit Date:  11/15/2022    This medical referred consult was provided via telehealth per patient request. Consent for treatment was given verbally. RD spent a total of 30 minutes with patient today.     Patient reports via telephone for her continued follow up today. She states that she has been making good progress for the most part. She reports that she underwent knee surgery at the end of October. She is in PT at this time, has progressed from using crutches to now walking with a cane. She is happy with her progress thus far. She is currently weighing in around 333 lb. She states that this fluctuates at this time. She reports that she is still utilizing smaller forks to decrease portion sizes, as well as healthy snacks. She is remaining consistent with positive nutrition changes. She is motivated to continue with these changes at this time.     Electronically signed by:  Candida Gomez RD  11/15/22 12:31 EST

## 2022-11-17 ENCOUNTER — HOSPITAL ENCOUNTER (OUTPATIENT)
Dept: PHYSICAL THERAPY | Facility: HOSPITAL | Age: 52
Setting detail: THERAPIES SERIES
Discharge: HOME | End: 2022-11-17

## 2022-11-17 PROCEDURE — 97110 THERAPEUTIC EXERCISES: CPT

## 2022-11-17 PROCEDURE — 97116 GAIT TRAINING THERAPY: CPT

## 2022-11-17 NOTE — THERAPY TREATMENT NOTE
Outpatient Physical Therapy Ortho Treatment Note   [place]     Patient Name: Jovita Camara  : 1970  MRN: [id number]  Today's Date: 2022      Visit Date: 2022    Visit Dx:  No diagnosis found.    Patient Active Problem List   Diagnosis   • Tremor   • Numbness and tingling in both hands   • Osteoarthritis of multiple joints   • Class 3 severe obesity due to excess calories without serious comorbidity with body mass index (BMI) of 60.0 to 69.9 in adult (HCC)   • Generalized anxiety disorder   • Tear of right meniscus as current injury   • Acute medial meniscus tear        Past Medical History:   Diagnosis Date   • Allergic    • Anxiety    • Arthritis    • Arthritis of back    • Encephalitis    • Headache    • Hip arthrosis    • Knee swelling    • Tear of meniscus of knee    • Torn meniscus 2022   • Tremors of nervous system     essential tremors        Past Surgical History:   Procedure Laterality Date   • ADENOIDECTOMY     • BILATERAL INSERTION OF EAR TUBES AND ADENOIDECTOMY     • KNEE ARTHROSCOPY Right 10/27/2022    Procedure: KNEE ARTHROSCOPY WITH PARTIAL MEDIAL MENISCECTOMY RIGHT;  Surgeon: Zia Ferraro MD;  Location: [facility];  Service: Orthopedics;  Laterality: Right;   • TONSILLECTOMY                          PT Assessment/Plan     Row Name 22 9772          PT Assessment    Assessment Comments Patient ambulating with mild antalgic gait without her cane and continued to ambulate in front of mirror for visual cues. She is showing improved knee extension during stance phase in correlation to improved quad strength.  -        PT Plan    PT Plan Comments Cont per POC  -           User Key  (r) = Recorded By, (t) = Taken By, (c) = Cosigned By    Initials Name Provider Type    Alejandro Cespedes, PT Physical Therapist                   OP Exercises     Row Name 22 9708             Subjective Comments    Subjective Comments Patient reports increased soreness after  last session.  -         Subjective Pain    Able to rate subjective pain? yes  -      Pre-Treatment Pain Level 1  -      Post-Treatment Pain Level 1  -         Total Minutes    60287 - Gait Training Minutes  6  -      97096 - PT Therapeutic Exercise Minutes 34  -         Exercise 1    Exercise Name 1 NuStep L7  -      Time 1 5 min  -         Exercise 2    Exercise Name 2 Sidelying hip abduction  -      Sets 2 2  -      Reps 2 10  -         Exercise 3    Exercise Name 3 LAQ  -      Sets 3 --  -      Reps 3 10  -         Exercise 4    Exercise Name 4 quad sets 3 sec isos/saq  -      Sets 4 --  -      Reps 4 10  -JH         Exercise 5    Exercise Name 5 STS from table taps  -      Sets 5 3  -      Reps 5 10  -JH         Exercise 6    Exercise Name 6 SLR  -      Sets 6 3  -      Reps 6 5  -JH         Exercise 7    Exercise Name 7 Bridges  -      Sets 7 2  -      Reps 7 10  -JH         Exercise 8    Exercise Name 8 Gait in parallel bars  -      Cueing 8 Verbal;Demo  -      Time 8 5 min  -         Exercise 9    Exercise Name 9 Standing hamstring curls  -      Sets 9 2  -      Reps 9 10  -JH            User Key  (r) = Recorded By, (t) = Taken By, (c) = Cosigned By    Initials Name Provider Type     Alejandro Lucia, PT Physical Therapist                                                Time Calculation:   Start Time: 0945  Timed Charges  53681 - PT Therapeutic Exercise Minutes: 34  64139 - Gait Training Minutes : 6  Total Minutes  Timed Charges Total Minutes: 40   Total Minutes: 40  Therapy Charges for Today     Code Description Service Date Service Provider Modifiers Qty    89939116182  PT THER PROC EA 15 MIN 11/17/2022 Alejandro Lucia, PT GP 2    14147416738 HC GAIT TRAINING EA 15 MIN 11/17/2022 Alejandro Lucia, PT GP 1                    Alejandro Lucia PT  11/17/2022

## 2022-11-30 ENCOUNTER — HOSPITAL ENCOUNTER (OUTPATIENT)
Dept: PHYSICAL THERAPY | Facility: HOSPITAL | Age: 52
Setting detail: THERAPIES SERIES
Discharge: HOME | End: 2022-11-30

## 2022-11-30 DIAGNOSIS — Z98.890 STATUS POST ARTHROSCOPY OF RIGHT KNEE: Primary | ICD-10-CM

## 2022-11-30 DIAGNOSIS — S83.231D COMPLEX TEAR OF MEDIAL MENISCUS OF RIGHT KNEE AS CURRENT INJURY, SUBSEQUENT ENCOUNTER: ICD-10-CM

## 2022-11-30 PROCEDURE — 97110 THERAPEUTIC EXERCISES: CPT

## 2022-12-02 ENCOUNTER — OFFICE VISIT (OUTPATIENT)
Dept: ORTHOPEDIC SURGERY | Facility: CLINIC | Age: 52
End: 2022-12-02

## 2022-12-02 ENCOUNTER — HOSPITAL ENCOUNTER (OUTPATIENT)
Dept: PHYSICAL THERAPY | Facility: HOSPITAL | Age: 52
Setting detail: THERAPIES SERIES
Discharge: HOME | End: 2022-12-02

## 2022-12-02 DIAGNOSIS — S83.231D COMPLEX TEAR OF MEDIAL MENISCUS OF RIGHT KNEE AS CURRENT INJURY, SUBSEQUENT ENCOUNTER: ICD-10-CM

## 2022-12-02 DIAGNOSIS — Z98.890 STATUS POST ARTHROSCOPY OF RIGHT KNEE: Primary | ICD-10-CM

## 2022-12-02 PROCEDURE — 99024 POSTOP FOLLOW-UP VISIT: CPT | Performed by: ORTHOPAEDIC SURGERY

## 2022-12-02 PROCEDURE — 97110 THERAPEUTIC EXERCISES: CPT

## 2022-12-02 RX ORDER — ACETAMINOPHEN 500 MG
500 TABLET ORAL EVERY 6 HOURS PRN
COMMUNITY

## 2022-12-02 NOTE — PROGRESS NOTES
Chief Complaint   Patient presents with   • Post-op     3 week f/u, 4 weeks s/p Knee Arthroscopy With Partial Medial Meniscectomy Right 10/27/22           HPI  She is doing well no new complaints.  [place] physical [place].      There were no vitals filed for this visit.      Physical Exam:  Right knee physical exam she has full motion full-strength.  Incisions healed well.  Negative Homans' sign        ICD-10-CM ICD-9-CM   1. Status post arthroscopy of right knee  Z98.890 V45.89   2. Complex tear of medial meniscus of right knee as current injury, subsequent encounter  S83.231D V58.89     She will finish out her physical therapy [place].  She works from home.  She will follow-up as needed.        Zia Ferraro M.D., [facility]  Orthopedic Surgeon  [facility]ellow[facility] Trained Sports Medicine  [facility]  [address] [place] [address], [address]  [place] [address]      EMR Dragon/Transcription disclaimer:  Much of this encounter note is an electronic transcription of spoken language to printed text. Electronic transcription of spoken language may permit erroneous, or at times, nonsensical words or phrases to be inadvertently transcribed. Although I have reviewed the note for such errors, some may still exist.

## 2022-12-02 NOTE — THERAPY TREATMENT NOTE
Outpatient Physical Therapy Ortho Treatment Note   [place]     Patient Name: Jovita Camara  : 1970  MRN: [id number]  Today's Date: 2022      Visit Date: 2022    Visit Dx:    ICD-10-CM ICD-9-CM   1. Status post arthroscopy of right knee  Z98.890 V45.89   2. Complex tear of medial meniscus of right knee as current injury, subsequent encounter  S83.231D V58.89       Patient Active Problem List   Diagnosis   • Tremor   • Numbness and tingling in both hands   • Osteoarthritis of multiple joints   • Class 3 severe obesity due to excess calories without serious comorbidity with body mass index (BMI) of 60.0 to 69.9 in adult (HCC)   • Generalized anxiety disorder   • Tear of right meniscus as current injury   • Acute medial meniscus tear        Past Medical History:   Diagnosis Date   • Allergic    • Anxiety    • Arthritis    • Arthritis of back    • Encephalitis    • Headache    • Hip arthrosis    • Knee swelling    • Tear of meniscus of knee    • Torn meniscus 2022   • Tremors of nervous system     essential tremors        Past Surgical History:   Procedure Laterality Date   • ADENOIDECTOMY     • BILATERAL INSERTION OF EAR TUBES AND ADENOIDECTOMY     • KNEE ARTHROSCOPY Right 10/27/2022    Procedure: KNEE ARTHROSCOPY WITH PARTIAL MEDIAL MENISCECTOMY RIGHT;  Surgeon: Zia Ferraro MD;  Location: [facility];  Service: Orthopedics;  Laterality: Right;   • TONSILLECTOMY                          PT Assessment/Plan     Row Name 22 0900          PT Assessment    Assessment Comments Patient able to progress to more single leg ther ex and building confidence standing on her knee. She has some fear with these movements due to weakness and fear of buckling but progressing well.  -        PT Plan    PT Plan Comments Cont per POC  -           User Key  (r) = Recorded By, (t) = Taken By, (c) = Cosigned By    Initials Name Provider Type    Alejandro Cespedes, PT Physical Therapist                    OP Exercises     Row Name 12/02/22 0900             Subjective Comments    Subjective Comments Patient reports she was pretty sore after last session.  -         Subjective Pain    Able to rate subjective pain? yes  -      Pre-Treatment Pain Level 2  -      Post-Treatment Pain Level 1  -         Total Minutes    12101 - PT Therapeutic Exercise Minutes 40  -JH         Exercise 1    Exercise Name 1 NuStep L7  -      Time 1 7 min  -         Exercise 2    Exercise Name 2 LAQ red TB  -      Sets 2 3  -      Reps 2 15  -JH         Exercise 3    Exercise Name 3 BOSU ball lunges  -      Sets 3 2  -JH      Reps 3 10  -JH         Exercise 4    Exercise Name 4 STS 15# KB  -      Sets 4 3  -JH      Reps 4 12  -         Exercise 5    Exercise Name 5 Heel slides  -      Sets 5 --  -      Reps 5 10  -         Exercise 6    Exercise Name 6 NuStep  -      Time 6 3 min L5  -         Exercise 7    Exercise Name 7 Hip Abd machine ext  -      Sets 7 2  -JH      Reps 7 10  -JH         Exercise 8    Exercise Name 8 Heel raises on slant board  -      Sets 8 3  -      Reps 8 15  -JH            User Key  (r) = Recorded By, (t) = Taken By, (c) = Cosigned By    Initials Name Provider Type     Alejandro Lucia, PT Physical Therapist                                                Time Calculation:   Start Time: 0900  Timed Charges  01006 - PT Therapeutic Exercise Minutes: 40  Total Minutes  Timed Charges Total Minutes: 40   Total Minutes: 40  Therapy Charges for Today     Code Description Service Date Service Provider Modifiers Qty    00672012567  PT THER PROC EA 15 MIN 12/2/2022 Alejandro Lucia, PT GP 3                    Alejandro Lucia PT  12/2/2022      show

## 2022-12-15 ENCOUNTER — HOSPITAL ENCOUNTER (OUTPATIENT)
Dept: PHYSICAL THERAPY | Facility: HOSPITAL | Age: 52
Setting detail: THERAPIES SERIES
Discharge: HOME | End: 2022-12-15

## 2022-12-15 DIAGNOSIS — S83.231D COMPLEX TEAR OF MEDIAL MENISCUS OF RIGHT KNEE AS CURRENT INJURY, SUBSEQUENT ENCOUNTER: ICD-10-CM

## 2022-12-15 DIAGNOSIS — Z98.890 STATUS POST ARTHROSCOPY OF RIGHT KNEE: Primary | ICD-10-CM

## 2022-12-15 PROCEDURE — 97110 THERAPEUTIC EXERCISES: CPT

## 2022-12-15 NOTE — THERAPY TREATMENT NOTE
Outpatient Physical Therapy Ortho Treatment Note   [place]     Patient Name: Jovita Camara  : 1970  MRN: [id number]  Today's Date: 12/15/2022      Visit Date: 12/15/2022    Visit Dx:    ICD-10-CM ICD-9-CM   1. Status post arthroscopy of right knee  Z98.890 V45.89   2. Complex tear of medial meniscus of right knee as current injury, subsequent encounter  S83.231D V58.89       Patient Active Problem List   Diagnosis   • Tremor   • Numbness and tingling in both hands   • Osteoarthritis of multiple joints   • Class 3 severe obesity due to excess calories without serious comorbidity with body mass index (BMI) of 60.0 to 69.9 in adult (HCC)   • Generalized anxiety disorder   • Tear of right meniscus as current injury   • Acute medial meniscus tear        Past Medical History:   Diagnosis Date   • Allergic    • Anxiety    • Arthritis    • Arthritis of back    • Encephalitis    • Headache    • Hip arthrosis    • Knee swelling    • Tear of meniscus of knee    • Torn meniscus 2022   • Tremors of nervous system     essential tremors        Past Surgical History:   Procedure Laterality Date   • ADENOIDECTOMY     • BILATERAL INSERTION OF EAR TUBES AND ADENOIDECTOMY     • KNEE ARTHROSCOPY Right 10/27/2022    Procedure: KNEE ARTHROSCOPY WITH PARTIAL MEDIAL MENISCECTOMY RIGHT;  Surgeon: Zia Ferraro MD;  Location: [facility];  Service: Orthopedics;  Laterality: Right;   • TONSILLECTOMY                          PT Assessment/Plan     Row Name 12/15/22 0930          PT Assessment    Assessment Comments Patient able to ambulate without cane at this time with no LOB. Challenged patient with new balance interventions to improve proprioceptive feedback and she was able to perform wobble board with BUE support.  -        PT Plan    PT Plan Comments Cont per POC  -           User Key  (r) = Recorded By, (t) = Taken By, (c) = Cosigned By    Initials Name Provider Type    Alejandro Cespedes, PT Physical Therapist                    OP Exercises     Row Name 12/15/22 0930             Subjective Comments    Subjective Comments Patient reports some stiffness after putting up lights and walking more.  -         Subjective Pain    Able to rate subjective pain? yes  -      Pre-Treatment Pain Level 1  -      Post-Treatment Pain Level 1  -         Total Minutes    31724 - PT Therapeutic Exercise Minutes 42  -JH         Exercise 1    Exercise Name 1 NuStep L7-8  -      Time 1 8 min  -         Exercise 2    Exercise Name 2 LAQ and hmastring curls yellow TB AMRAP  -      Sets 2 3  -         Exercise 3    Exercise Name 3 BOSU ball lunges  -      Sets 3 2  -JH      Reps 3 10  -JH         Exercise 4    Exercise Name 4 STS 15# KB  -      Sets 4 3  -JH      Reps 4 12  -JH         Exercise 5    Exercise Name 5 Slant board heel raises  -      Reps 5 20  -JH         Exercise 6    Exercise Name 6 single leg balance  -      Time 6 3 min  -         Exercise 7    Exercise Name 7 wobble board  -      Time 7 3 min  -            User Key  (r) = Recorded By, (t) = Taken By, (c) = Cosigned By    Initials Name Provider Type    Alejandro Cespedes, PT Physical Therapist                                                Time Calculation:   Start Time: 0930  Timed Charges  62089 - PT Therapeutic Exercise Minutes: 42  Total Minutes  Timed Charges Total Minutes: 42   Total Minutes: 42  Therapy Charges for Today     Code Description Service Date Service Provider Modifiers Qty    [id number]  PT THER PROC EA 15 MIN 12/15/2022 Alejandro Lucia, PT GP 3                    Alejandro Lucia PT  12/15/2022

## 2022-12-21 ENCOUNTER — HOSPITAL ENCOUNTER (OUTPATIENT)
Dept: PHYSICAL THERAPY | Facility: HOSPITAL | Age: 52
Setting detail: THERAPIES SERIES
Discharge: HOME | End: 2022-12-21

## 2022-12-21 DIAGNOSIS — Z98.890 STATUS POST ARTHROSCOPY OF RIGHT KNEE: Primary | ICD-10-CM

## 2022-12-21 DIAGNOSIS — S83.231D COMPLEX TEAR OF MEDIAL MENISCUS OF RIGHT KNEE AS CURRENT INJURY, SUBSEQUENT ENCOUNTER: ICD-10-CM

## 2022-12-21 PROCEDURE — 97110 THERAPEUTIC EXERCISES: CPT

## 2022-12-21 NOTE — THERAPY TREATMENT NOTE
Outpatient Physical Therapy Ortho Treatment Note   [place]     Patient Name: Jovita Camara  : 1970  MRN: [id number]  Today's Date: 2022      Visit Date: 2022    Visit Dx:    ICD-10-CM ICD-9-CM   1. Status post arthroscopy of right knee  Z98.890 V45.89   2. Complex tear of medial meniscus of right knee as current injury, subsequent encounter  S83.231D V58.89       Patient Active Problem List   Diagnosis   • Tremor   • Numbness and tingling in both hands   • Osteoarthritis of multiple joints   • Class 3 severe obesity due to excess calories without serious comorbidity with body mass index (BMI) of 60.0 to 69.9 in adult (HCC)   • Generalized anxiety disorder   • Tear of right meniscus as current injury   • Acute medial meniscus tear        Past Medical History:   Diagnosis Date   • Allergic    • Anxiety    • Arthritis    • Arthritis of back    • Encephalitis    • Headache    • Hip arthrosis    • Knee swelling    • Tear of meniscus of knee    • Torn meniscus 2022   • Tremors of nervous system     essential tremors        Past Surgical History:   Procedure Laterality Date   • ADENOIDECTOMY     • BILATERAL INSERTION OF EAR TUBES AND ADENOIDECTOMY     • KNEE ARTHROSCOPY Right 10/27/2022    Procedure: KNEE ARTHROSCOPY WITH PARTIAL MEDIAL MENISCECTOMY RIGHT;  Surgeon: Zia Ferraro MD;  Location: [facility];  Service: Orthopedics;  Laterality: Right;   • TONSILLECTOMY                          PT Assessment/Plan     Row Name 22 0840          PT Assessment    Assessment Comments Patient challenged with functional sled pushes and pulls with good performance. Working on gaining active knee extension with backwards pulling to engage quads. Full PROM knee extension easily performed.  -        PT Plan    PT Plan Comments Cont per POC  -           User Key  (r) = Recorded By, (t) = Taken By, (c) = Cosigned By    Initials Name Provider Type    Alejandro Cespedes, PT Physical Therapist                    OP Exercises     Row Name 12/21/22 0830             Subjective Comments    Subjective Comments Patient reports she was able to go up and down 21 stairs.  -         Subjective Pain    Able to rate subjective pain? yes  -      Pre-Treatment Pain Level 1  -      Post-Treatment Pain Level 1  -         Total Minutes    93127 - PT Therapeutic Exercise Minutes 45  -JH         Exercise 1    Exercise Name 1 NuStep L7  -      Time 1 10.5 min  -         Exercise 2    Exercise Name 2 Sled push and pulls  -      Sets 2 3  -      Time 2 10 min  -         Exercise 3    Exercise Name 3 Manual resisted LAQ  -      Sets 3 --  -      Reps 3 --  -      Additional Comments output 32-40 pounds  -         Exercise 4    Exercise Name 4 STS 20# KB  -      Sets 4 3  -JH      Reps 4 10  -JH         Exercise 5    Exercise Name 5 Hip abduction machine  -      Sets 5 2  -JH      Reps 5 10  -JH         Exercise 6    Exercise Name 6 SLR  -      Sets 6 2  -JH      Reps 6 10  -JH      Time 6 --  -         Exercise 7    Exercise Name 7 --  -      Time 7 --  -            User Key  (r) = Recorded By, (t) = Taken By, (c) = Cosigned By    Initials Name Provider Type    Alejandro Cespedes, PT Physical Therapist                                                Time Calculation:   Start Time: 0830  Timed Charges  33491 - PT Therapeutic Exercise Minutes: 45  Total Minutes  Timed Charges Total Minutes: 45   Total Minutes: 45  Therapy Charges for Today     Code Description Service Date Service Provider Modifiers Qty    32730482072  PT THER PROC EA 15 MIN 12/21/2022 Alejandro Lucia, PT GP 3                    Alejandro Lucia PT  12/21/2022

## 2023-01-04 ENCOUNTER — HOSPITAL ENCOUNTER (OUTPATIENT)
Dept: PHYSICAL THERAPY | Facility: HOSPITAL | Age: 53
Setting detail: THERAPIES SERIES
Discharge: HOME | End: 2023-01-04
Payer: COMMERCIAL

## 2023-01-04 DIAGNOSIS — Z98.890 STATUS POST ARTHROSCOPY OF RIGHT KNEE: Primary | ICD-10-CM

## 2023-01-04 DIAGNOSIS — S83.231D COMPLEX TEAR OF MEDIAL MENISCUS OF RIGHT KNEE AS CURRENT INJURY, SUBSEQUENT ENCOUNTER: ICD-10-CM

## 2023-01-04 PROCEDURE — 97110 THERAPEUTIC EXERCISES: CPT

## 2023-01-04 NOTE — THERAPY PROGRESS REPORT/RE-CERT
Outpatient Physical Therapy Ortho Progress Note   [place]     Patient Name: Jovita Camara  : 1970  MRN: [id number]  Today's Date: 2023      Visit Date: 2023    Visit Dx:    ICD-10-CM ICD-9-CM   1. Status post arthroscopy of right knee  Z98.890 V45.89   2. Complex tear of medial meniscus of right knee as current injury, subsequent encounter  S83.231D V58.89       Patient Active Problem List   Diagnosis   • Tremor   • Numbness and tingling in both hands   • Osteoarthritis of multiple joints   • Class 3 severe obesity due to excess calories without serious comorbidity with body mass index (BMI) of 60.0 to 69.9 in adult (HCC)   • Generalized anxiety disorder   • Tear of right meniscus as current injury   • Acute medial meniscus tear        Past Medical History:   Diagnosis Date   • Allergic    • Anxiety    • Arthritis    • Arthritis of back    • Encephalitis    • Headache    • Hip arthrosis    • Knee swelling    • Tear of meniscus of knee    • Torn meniscus 2022   • Tremors of nervous system     essential tremors        Past Surgical History:   Procedure Laterality Date   • ADENOIDECTOMY     • BILATERAL INSERTION OF EAR TUBES AND ADENOIDECTOMY     • KNEE ARTHROSCOPY Right 10/27/2022    Procedure: KNEE ARTHROSCOPY WITH PARTIAL MEDIAL MENISCECTOMY RIGHT;  Surgeon: Zia Ferraro MD;  Location: [facility];  Service: Orthopedics;  Laterality: Right;   • TONSILLECTOMY          PT Ortho     Row Name 23 0800       Right Lower Ext    Rt Knee Extension/Flexion AROM 0-110  -JH       MMT (Manual Muscle Testing)    General MMT Comments 15 SLR without lag, 3x12 20# DB STS  -JH          User Key  (r) = Recorded By, (t) = Taken By, (c) = Cosigned By    Initials Name Provider Type    Alejandro Cespedes, PT Physical Therapist                             PT Assessment/Plan     Row Name 23 0800          PT Assessment    Functional Limitations Impaired gait;Impaired locomotion;Limitation in home  management;Performance in self-care ADL;Performance in leisure activities;Performance in work activities;Limitations in community activities  -     Impairments Balance;Endurance;Gait;Range of motion;Pain;Muscle strength;Joint mobility;Joint integrity  -     Assessment Comments Patient is showing good functional progress with her overall mobility as well as strength and overall range of motion.  Her outcome score did not significantly improve however most of the her difficulty is with the more advanced mobility tasks such as running, hopping, quick cuts..  Which are not necessary for her.  She is showing good progress toward meeting her goals and expect to be appropriate for discharge in a couple of weeks.  Planning to perform 1 more session to maximize overall functional mobility and progress towards independent home exercise program at that time.  -     Please refer to paper survey for additional self-reported information Yes  -     Rehab Potential Good  -     Patient/caregiver participated in establishment of treatment plan and goals Yes  -     Patient would benefit from skilled therapy intervention Yes  -        PT Plan    PT Frequency 2x/week  -     Predicted Duration of Therapy Intervention (PT) 1 more visit  -     Planned CPT's? PT THER PROC EA 15 MIN: 95116  -     PT Plan Comments Plan on performing 1 additional visit to ensure no complications and maximize functional outcomes with transition to home exercise program.  -           User Key  (r) = Recorded By, (t) = Taken By, (c) = Cosigned By    Initials Name Provider Type    Alejandro Cespedes, PT Physical Therapist                   OP Exercises     Row Name 01/04/23 0800             Subjective Comments    Subjective Comments Patient reports she has been standing on her legs more with improved ability.  Patient reports she is having some trouble with getting her full knee extension back.  -         Subjective Pain    Able to rate  subjective pain? yes  -      Pre-Treatment Pain Level 0  -      Post-Treatment Pain Level 0  -         Total Minutes    01317 - PT Therapeutic Exercise Minutes 42  -JH         Exercise 1    Exercise Name 1 NuStep L7  -      Time 1 8 min  -         Exercise 2    Exercise Name 2 Sled push and pulls, yellow TB for TKE  -JH      Sets 2 --  -JH      Time 2 10 min  -JH         Exercise 3    Exercise Name 3 Time taken to complete outcome measures, update goals, review plan of care, patient education on progression towards discharge.  -JH      Time 3 7 minutes  -         Exercise 4    Exercise Name 4 STS 20# KB  -JH      Sets 4 3  -JH      Reps 4 12  -JH         Exercise 5    Exercise Name 5 SLR  -JH      Sets 5 2  -JH      Reps 5 15 / 12  -JH         Exercise 6    Exercise Name 6 Bridges  -JH      Sets 6 2  -JH      Reps 6 10  -JH            User Key  (r) = Recorded By, (t) = Taken By, (c) = Cosigned By    Initials Name Provider Type     Alejandro Lucia, PT Physical Therapist                              PT OP Goals     Row Name 01/04/23 0800          PT Short Term Goals    STG Date to Achieve 11/25/22  -     STG 1 Compliant with HEP for optimal outcomes  -     STG 1 Progress Met  -     STG 2 LEFS 50 or greater to indicate improved function  -     STG 2 Progress Ongoing  -     STG 3 Patient will be able to ambulate with no AD with pain less than 1/10  -     STG 3 Progress Met  -        Long Term Goals    LTG Date to Achieve 12/16/22  -     LTG 1 LEFS 60 or more to indicate improved function  -     LTG 1 Progress Ongoing  -     LTG 2 Pt will be able to navigate stairs with pain less than 1/10  -     LTG 2 Progress Ongoing  -     LTG 2 Progress Comments 2/10  -     LTG 3 Patient will be able to demonstrate ability to perform 15 SLR without rest break an no ext lag to indicate improved quad function and strength  -     LTG 3 Progress Met  -        Time Calculation    PT Goal Re-Cert  Due Date 02/02/23  -GINNY           User Key  (r) = Recorded By, (t) = Taken By, (c) = Cosigned By    Initials Name Provider Type    Alejandro Cespedes, PT Physical Therapist                        Lower Extremity Functional Index  Any of your usual work, housework or school activities: A little bit of difficulty  Your usual hobbies, recreational or sporting activities: Extreme difficulty or unable to perform activity  Getting into or out of the bath: A little bit of difficulty  Walking between rooms: No difficulty  Putting on your shoes or socks: No difficulty  Squatting: Moderate difficulty  Lifting an object, like a bag of groceries from the floor: No difficulty  Performing light activities around your home: No difficulty  Performing heavy activities around your home: A little bit of difficulty  Getting into or out of a car: A little bit of difficulty  Walking 2 blocks: Quite a bit of difficulty  Walking a mile: Quite a bit of difficulty  Going up or down 10 stairs (about 1 flight of stairs): A little bit of difficulty  Standing for 1 hour: Quite a bit of difficulty  Sitting for 1 hour: A little bit of difficulty  Running on even ground: Extreme difficulty or unable to perform activity  Running on uneven ground: Extreme difficulty or unable to perform activity  Making sharp turns while running fast: Extreme difficulty or unable to perform activity  Hopping: Extreme difficulty or unable to perform activity  Rolling over in bed: A little bit of difficulty  Total: 42      Time Calculation:   Start Time: 0800  Timed Charges  88930 - PT Therapeutic Exercise Minutes: 42  Total Minutes  Timed Charges Total Minutes: 42   Total Minutes: 42  Therapy Charges for Today     Code Description Service Date Service Provider Modifiers Qty    81066222821 HC PT THER PROC EA 15 MIN 1/4/2023 Alejandro Lucia, PT GP 3          PT G-Codes  Total: 42         Alejandro Lucia PT  1/4/2023      Unable to consent due to medical condition

## 2023-01-19 ENCOUNTER — HOSPITAL ENCOUNTER (OUTPATIENT)
Dept: PHYSICAL THERAPY | Facility: HOSPITAL | Age: 53
Setting detail: THERAPIES SERIES
Discharge: HOME | End: 2023-01-19
Payer: COMMERCIAL

## 2023-01-19 DIAGNOSIS — S83.231D COMPLEX TEAR OF MEDIAL MENISCUS OF RIGHT KNEE AS CURRENT INJURY, SUBSEQUENT ENCOUNTER: ICD-10-CM

## 2023-01-19 DIAGNOSIS — Z98.890 STATUS POST ARTHROSCOPY OF RIGHT KNEE: Primary | ICD-10-CM

## 2023-01-19 PROCEDURE — 97110 THERAPEUTIC EXERCISES: CPT

## 2023-01-19 NOTE — THERAPY DISCHARGE NOTE
Outpatient Physical Therapy Ortho Progress Note/Discharge Summary   [place]     Patient Name: Jovita Camara  : 1970  MRN: [id number]  Today's Date: 2023      Visit Date: 2023    Visit Dx:    ICD-10-CM ICD-9-CM   1. Status post arthroscopy of right knee  Z98.890 V45.89   2. Complex tear of medial meniscus of right knee as current injury, subsequent encounter  S83.231D V58.89       Patient Active Problem List   Diagnosis   • Tremor   • Numbness and tingling in both hands   • Osteoarthritis of multiple joints   • Class 3 severe obesity due to excess calories without serious comorbidity with body mass index (BMI) of 60.0 to 69.9 in adult (HCC)   • Generalized anxiety disorder   • Tear of right meniscus as current injury   • Acute medial meniscus tear        Past Medical History:   Diagnosis Date   • Allergic    • Anxiety    • Arthritis    • Arthritis of back    • Encephalitis    • Headache    • Hip arthrosis    • Knee swelling    • Tear of meniscus of knee    • Torn meniscus 2022   • Tremors of nervous system     essential tremors        Past Surgical History:   Procedure Laterality Date   • ADENOIDECTOMY     • BILATERAL INSERTION OF EAR TUBES AND ADENOIDECTOMY     • KNEE ARTHROSCOPY Right 10/27/2022    Procedure: KNEE ARTHROSCOPY WITH PARTIAL MEDIAL MENISCECTOMY RIGHT;  Surgeon: Zia Ferraro MD;  Location: [facility];  Service: Orthopedics;  Laterality: Right;   • TONSILLECTOMY          PT Ortho     Row Name 23 0800       Subjective Comments    Subjective Comments Patient reports she is about 85% back to normal and feels comfortable with transitioning to home program at this time.  -       Subjective Pain    Able to rate subjective pain? yes  -    Pre-Treatment Pain Level 1  -    Post-Treatment Pain Level 1  -          User Key  (r) = Recorded By, (t) = Taken By, (c) = Cosigned By    Initials Name Provider Type    Alejandro Cespedes PT Physical Therapist                              PT Assessment/Plan     Row Name 01/19/23 0800          PT Assessment    Assessment Comments Patient has good progress throughout her plan of care and met all of her goals at this time.  Patient has improved her LEFS score to a 60 showing significant improvement since initial evaluation.  Patient is able to ambulate and navigate stairs without pain.  Patient is able to perform all exercises in clinic independently and able to transition to an independent HEP at this time.  Patient is able to perform 20 straight leg raises consecutively without lag and she is able to perform 20 bridges consecutively.  Patient showing strength within functional limits and range of motion within functional limits at this time to be appropriate for discharge.  -        PT Plan    PT Plan Comments Discharge  -           User Key  (r) = Recorded By, (t) = Taken By, (c) = Cosigned By    Initials Name Provider Type    Alejandro Cespedes, PT Physical Therapist                     OP Exercises     Row Name 01/19/23 0800             Subjective Comments    Subjective Comments Patient reports she is about 85% back to normal and feels comfortable with transitioning to home program at this time.  -         Subjective Pain    Able to rate subjective pain? yes  -      Pre-Treatment Pain Level 1  -      Post-Treatment Pain Level 1  -         Total Minutes    42315 - PT Therapeutic Exercise Minutes 40  -         Exercise 1    Exercise Name 1 NuStep level 7 8 minutes  -         Exercise 2    Exercise Name 2 Sit to stands with 15 pound kettle bell 3X 15  -         Exercise 3    Exercise Name 3 Time taken to provide patient education on continued HEP and management of symptoms, and update goals, time to update objectives, time taken to review her plan of care and recommendations for discharge at this time because she is able to transition to independent HEP.  -      Time 3 8 min  -         Exercise 4    Exercise  Name 4 Sled push times down the back in clinic  -         Exercise 5    Exercise Name 5 SLR  -      Reps 5 20  -         Exercise 6    Exercise Name 6 Bridges  -      Reps 6 20  -         Exercise 7    Exercise Name 7 LAQ  -      Sets 7 2  -      Reps 7 15  -         Exercise 8    Exercise Name 8 Standing hamstring curls 4 pound ankle weight  -      Sets 8 2  -      Reps 8 15  -            User Key  (r) = Recorded By, (t) = Taken By, (c) = Cosigned By    Initials Name Provider Type    Alejandro Cespedes, PT Physical Therapist                                PT OP Goals     Row Name 01/19/23 0800          PT Short Term Goals    STG Date to Achieve 11/25/22  -     STG 1 Compliant with HEP for optimal outcomes  -     STG 1 Progress Met  [facility]     STG 2 LEFS 50 or greater to indicate improved function  -     STG 2 Progress [facility]     STG 3 Patient will be able to ambulate with no AD with pain less than 1/10  -     STG 3 Progress [facility]        Long Term Goals    LTG Date to Achieve 12/16/22  -     LTG 1 LEFS 60 or more to indicate improved function  -     LTG 1 Progress Met  [facility]     LTG 2 Pt will be able to navigate stairs with pain less than 1/10  -     LTG 2 Progress Met  -     LTG 3 Patient will be able to demonstrate ability to perform 15 SLR without rest break an no ext lag to indicate improved quad function and strength  -[facility] 3 Progress [facility]        Time Calculation    PT Goal Re-Cert Due Date 02/02/23  [facility]           User Key  (r) = Recorded By, (t) = Taken By, (c) = Cosigned By    Initials Name Provider Type    Alejandro Cespedes, PT Physical Therapist                        Lower Extremity Functional Index  Any of your usual work, housework or school activities: No difficulty  Your usual hobbies, recreational or sporting activities: A little bit of difficulty  Getting into or out of the bath: No difficulty  Walking between rooms: No difficulty  Putting on your shoes  or socks: No difficulty  Squatting: Moderate difficulty  Lifting an object, like a bag of groceries from the floor: No difficulty  Performing light activities around your home: No difficulty  Performing heavy activities around your home: A little bit of difficulty  Getting into or out of a car: No difficulty  Walking 2 blocks: A little bit of difficulty  Walking a mile: Moderate difficulty  Going up or down 10 stairs (about 1 flight of stairs): No difficulty  Standing for 1 hour: Moderate difficulty  Sitting for 1 hour: No difficulty  Running on even ground: Quite a bit of difficulty  Running on uneven ground: Quite a bit of difficulty  Making sharp turns while running fast: Quite a bit of difficulty  Hopping: Moderate difficulty  Rolling over in bed: No difficulty  Total: 60      Time Calculation:   Start Time: 0800  Timed Charges  31237 - PT Therapeutic Exercise Minutes: 40  Total Minutes  Timed Charges Total Minutes: 40   Total Minutes: 40  Therapy Charges for Today     Code Description Service Date Service Provider Modifiers Qty    19096219275 HC PT THER PROC EA 15 MIN 1/19/2023 Alejandro Lucia, PT GP 3          PT G-Codes  Total: 60     OP PT Discharge Summary  Date of Discharge: 01/19/23  Reason for Discharge: All goals achieved  Outcomes Achieved: Able to achieve all goals within established timeline  Discharge Destination: Home with home program  Discharge Instructions/Additional Comments: Appropriate to discharge to independent home program at this time      Alejandro Lucia PT  1/19/2023

## 2023-01-24 ENCOUNTER — OFFICE VISIT (OUTPATIENT)
Dept: INTERNAL MEDICINE | Facility: CLINIC | Age: 53
End: 2023-01-24
Payer: COMMERCIAL

## 2023-01-24 ENCOUNTER — LAB (OUTPATIENT)
Dept: INTERNAL MEDICINE | Facility: CLINIC | Age: 53
End: 2023-01-24
Payer: COMMERCIAL

## 2023-01-24 VITALS
HEART RATE: 67 BPM | OXYGEN SATURATION: 98 % | WEIGHT: 293 LBS | TEMPERATURE: 98.4 F | DIASTOLIC BLOOD PRESSURE: 96 MMHG | BODY MASS INDEX: 55.32 KG/M2 | SYSTOLIC BLOOD PRESSURE: 146 MMHG | HEIGHT: 61 IN

## 2023-01-24 DIAGNOSIS — F41.1 GENERALIZED ANXIETY DISORDER: ICD-10-CM

## 2023-01-24 DIAGNOSIS — Z11.59 NEED FOR HEPATITIS C SCREENING TEST: ICD-10-CM

## 2023-01-24 DIAGNOSIS — Z13.29 SCREENING FOR ENDOCRINE DISORDER: ICD-10-CM

## 2023-01-24 DIAGNOSIS — F41.1 GENERALIZED ANXIETY DISORDER: Primary | ICD-10-CM

## 2023-01-24 DIAGNOSIS — E55.9 VITAMIN D DEFICIENCY: ICD-10-CM

## 2023-01-24 DIAGNOSIS — Z13.220 SCREENING, LIPID: ICD-10-CM

## 2023-01-24 DIAGNOSIS — Z23 NEED FOR VACCINATION: ICD-10-CM

## 2023-01-24 DIAGNOSIS — R03.0 ELEVATED BP WITHOUT DIAGNOSIS OF HYPERTENSION: ICD-10-CM

## 2023-01-24 LAB
25(OH)D3 SERPL-MCNC: 45.4 NG/ML (ref 30–100)
ALBUMIN SERPL-MCNC: 4.2 G/DL (ref 3.5–5.2)
ALBUMIN/GLOB SERPL: 1.7 G/DL
ALP SERPL-CCNC: 65 U/L (ref 39–117)
ALT SERPL W P-5'-P-CCNC: 21 U/L (ref 1–33)
ANION GAP SERPL CALCULATED.3IONS-SCNC: 8.2 MMOL/L (ref 5–15)
AST SERPL-CCNC: 20 U/L (ref 1–32)
BASOPHILS # BLD AUTO: 0.02 10*3/MM3 (ref 0–0.2)
BASOPHILS NFR BLD AUTO: 0.3 % (ref 0–1.5)
BILIRUB SERPL-MCNC: 0.3 MG/DL (ref 0–1.2)
BUN SERPL-MCNC: 14 MG/DL (ref 6–20)
BUN/CREAT SERPL: 18.9 (ref 7–25)
CALCIUM SPEC-SCNC: 9.5 MG/DL (ref 8.6–10.5)
CHLORIDE SERPL-SCNC: 104 MMOL/L (ref 98–107)
CHOLEST SERPL-MCNC: 206 MG/DL (ref 0–200)
CO2 SERPL-SCNC: 27.8 MMOL/L (ref 22–29)
CREAT SERPL-MCNC: 0.74 MG/DL (ref 0.57–1)
DEPRECATED RDW RBC AUTO: 39.7 FL (ref 37–54)
EGFRCR SERPLBLD CKD-EPI 2021: 96.3 ML/MIN/1.73
EOSINOPHIL # BLD AUTO: 0.05 10*3/MM3 (ref 0–0.4)
EOSINOPHIL NFR BLD AUTO: 0.7 % (ref 0.3–6.2)
ERYTHROCYTE [DISTWIDTH] IN BLOOD BY AUTOMATED COUNT: 12.6 % (ref 12.3–15.4)
GLOBULIN UR ELPH-MCNC: 2.5 GM/DL
GLUCOSE SERPL-MCNC: 89 MG/DL (ref 65–99)
HCT VFR BLD AUTO: 43.5 % (ref 34–46.6)
HCV AB SER DONR QL: NORMAL
HDLC SERPL-MCNC: 63 MG/DL (ref 40–60)
HGB BLD-MCNC: 14.1 G/DL (ref 12–15.9)
IMM GRANULOCYTES # BLD AUTO: 0.02 10*3/MM3 (ref 0–0.05)
IMM GRANULOCYTES NFR BLD AUTO: 0.3 % (ref 0–0.5)
LDLC SERPL CALC-MCNC: 119 MG/DL (ref 0–100)
LDLC/HDLC SERPL: 1.84 {RATIO}
LYMPHOCYTES # BLD AUTO: 2.31 10*3/MM3 (ref 0.7–3.1)
LYMPHOCYTES NFR BLD AUTO: 33.1 % (ref 19.6–45.3)
MCH RBC QN AUTO: 28.5 PG (ref 26.6–33)
MCHC RBC AUTO-ENTMCNC: 32.4 G/DL (ref 31.5–35.7)
MCV RBC AUTO: 87.9 FL (ref 79–97)
MONOCYTES # BLD AUTO: 0.54 10*3/MM3 (ref 0.1–0.9)
MONOCYTES NFR BLD AUTO: 7.7 % (ref 5–12)
NEUTROPHILS NFR BLD AUTO: 4.03 10*3/MM3 (ref 1.7–7)
NEUTROPHILS NFR BLD AUTO: 57.9 % (ref 42.7–76)
NRBC BLD AUTO-RTO: 0 /100 WBC (ref 0–0.2)
PLATELET # BLD AUTO: 279 10*3/MM3 (ref 140–450)
PMV BLD AUTO: 11.4 FL (ref 6–12)
POTASSIUM SERPL-SCNC: 4.4 MMOL/L (ref 3.5–5.2)
PROT SERPL-MCNC: 6.7 G/DL (ref 6–8.5)
RBC # BLD AUTO: 4.95 10*6/MM3 (ref 3.77–5.28)
SODIUM SERPL-SCNC: 140 MMOL/L (ref 136–145)
TRIGL SERPL-MCNC: 135 MG/DL (ref 0–150)
TSH SERPL DL<=0.05 MIU/L-ACNC: 0.8 UIU/ML (ref 0.27–4.2)
VLDLC SERPL-MCNC: 24 MG/DL (ref 5–40)
WBC NRBC COR # BLD: 6.97 10*3/MM3 (ref 3.4–10.8)

## 2023-01-24 PROCEDURE — 80050 GENERAL HEALTH PANEL: CPT | Performed by: PHYSICIAN ASSISTANT

## 2023-01-24 PROCEDURE — 82306 VITAMIN D 25 HYDROXY: CPT | Performed by: PHYSICIAN ASSISTANT

## 2023-01-24 PROCEDURE — 86803 HEPATITIS C AB TEST: CPT | Performed by: PHYSICIAN ASSISTANT

## 2023-01-24 PROCEDURE — 90750 HZV VACC RECOMBINANT IM: CPT | Performed by: PHYSICIAN ASSISTANT

## 2023-01-24 PROCEDURE — 80061 LIPID PANEL: CPT | Performed by: PHYSICIAN ASSISTANT

## 2023-01-24 PROCEDURE — 99214 OFFICE O/P EST MOD 30 MIN: CPT | Performed by: PHYSICIAN ASSISTANT

## 2023-01-24 PROCEDURE — 90471 IMMUNIZATION ADMIN: CPT | Performed by: PHYSICIAN ASSISTANT

## 2023-01-24 RX ORDER — DULOXETIN HYDROCHLORIDE 30 MG/1
30 CAPSULE, DELAYED RELEASE ORAL DAILY
Qty: 90 CAPSULE | Refills: 2 | Status: SHIPPED | OUTPATIENT
Start: 2023-01-24

## 2023-01-24 NOTE — ASSESSMENT & PLAN NOTE
Will monitor, she has BP cuff at home and will check there and let me know if it is consistently 140s/90s or higher.

## 2023-01-24 NOTE — LETTER
"VACCINE CONSENT FORM      Patient Name:  Jovita Camara    Patient :  1970      I/We have read, or have been explained, the information about the diseases and the vaccines listed below.  There was an opportunity to ask questions and any questions were answered satisfactorily.  I/We believe that I/we understand the benefits and risks of the vaccines(s) cited, and ask the vaccine(s) listed below be given to me/us or the person named above (for which i have authorized to make the request).      Vaccine(s) give:    Orders Placed This Encounter   Procedures   • Shingrix Vaccine         Medicare patients:    The only vaccine covered under your medical benefit is flu/pneumonia and hepatitis B.  All other may be covered under your \"Part D\" prescription plan and requires you to go to a pharmacy with a Physician orders for administration.  If you still prefer to have it administered at our office, you will receive a bill for the vaccine and administration cost.               Patient Initials                     Patient or Parent/Guardian Signature                    Date        A copy of the appropriate Centers for Disease Control and Prevention Vaccine Information Statements has been provided.   " done

## 2023-01-24 NOTE — PROGRESS NOTES
Chief Complaint  Anxiety    Subjective          History of Present Illness  Jovita Camara presents to [facility] for   History of Present Illness  Anxiety/Depression:  Has been on Cymbalta for the last 2 years and doing well with that. Tried 60mg dose in the past of Cymbalta but it made her to sleepy so is doing well on 30mg and not having side effects.  No HI/SI.    Tremor:  Has a shaky hand on the right side. Was started on Topamax but it did not help. She had normal EMG.  Is followed by Kaycee gabriel. The tremor is worse at rest. Is to f/u if worsens.     Vit D Def:  Is taking vit D daily.     Rt Torn Meniscus:  Recently had knee surgery through  ortho. Doing well, feels like knee is 80-90% better.    Obesity:  Has been working on weight loss with nutritionist. She sees her every 6 weeks. Has lost about 20lbs. Is making diet changes.     Elevated BP:  Normally it is in the 130s range, plans on checking it at home. Does not have CP, SOA, or edema other than from recent knee surgery.       The following portions of the patient's history were reviewed and updated as appropriate: allergies, current medications, past family history, past medical history, past social history, past surgical history and problem list.  Allergies   Allergen Reactions   • Molds & Smuts Cough and Headache   • Seasonal Ic [Cholestatin] Other (See Comments), Cough and Headache     Leaf dust       Current Outpatient Medications:   •  acetaminophen (TYLENOL) 500 MG tablet, Take 500 mg by mouth Every 6 (Six) Hours As Needed for Mild Pain., Disp: , Rfl:   •  calcium-vitamin D 250-100 MG-UNIT per tablet, Take 1 tablet by mouth 2 (Two) Times a Day., Disp: , Rfl:   •  cetirizine (zyrTEC) 10 MG tablet, Take 10 mg by mouth Daily., Disp: , Rfl:   •  diphenhydrAMINE-acetaminophen (TYLENOL PM)  MG tablet per tablet, Take 1 tablet by mouth At Night As Needed for Sleep., Disp: , Rfl:   •  DULoxetine  "(CYMBALTA) 30 MG capsule, Take 1 capsule by mouth Daily., Disp: 90 capsule, Rfl: 2  •  multivitamin with minerals tablet tablet, Take 1 tablet by mouth Daily., Disp: , Rfl:   •  Diclofenac Sodium (VOLTAREN) 1 % gel gel, Apply 4 g topically to the appropriate area as directed 4 (Four) Times a Day As Needed (pain)., Disp: 200 g, Rfl: 1  New Medications Ordered This Visit   Medications   • DULoxetine (CYMBALTA) 30 MG capsule     Sig: Take 1 capsule by mouth Daily.     Dispense:  90 capsule     Refill:  2     Social History     Tobacco Use   Smoking Status Former   • Packs/day: 0.25   • Years: 23.00   • Pack years: 5.75   • Types: Cigarettes   • Start date: 1988   • Quit date: 2011   • Years since quittin.1   Smokeless Tobacco Never   Tobacco Comments    very light, social more than anything, heaviest during 911        Objective   Vital Signs:   Vitals:    23 0820   BP: 146/96   Pulse: 67   Temp: 98.4 °F (36.9 °C)   TempSrc: Temporal   SpO2: 98%   Weight: (!) 154 kg (339 lb 12.8 oz)   Height: 154.9 cm (60.98\")      Physical Exam  Vitals reviewed.   Constitutional:       General: She is not in acute distress.     Appearance: Normal appearance. She is obese. She is not ill-appearing.   HENT:      Head: Normocephalic and atraumatic.      Right Ear: Tympanic membrane, ear canal and external ear normal.      Left Ear: Tympanic membrane, ear canal and external ear normal.      Mouth/Throat:      Mouth: Mucous membranes are moist.      Pharynx: No oropharyngeal exudate or posterior oropharyngeal erythema.   Eyes:      General: No scleral icterus.     Extraocular Movements: Extraocular movements intact.      Conjunctiva/sclera: Conjunctivae normal.      Pupils: Pupils are equal, round, and reactive to light.   Neck:      Thyroid: No thyromegaly.   Cardiovascular:      Rate and Rhythm: Normal rate and regular rhythm.      Pulses: Normal pulses.      Heart sounds: Normal heart sounds. No murmur " heard.  Pulmonary:      Effort: Pulmonary effort is normal. No respiratory distress.      Breath sounds: Normal breath sounds. No stridor. No wheezing, rhonchi or rales.   Abdominal:      General: Bowel sounds are normal. There is no distension.      Palpations: Abdomen is soft. There is no mass.      Tenderness: There is no abdominal tenderness. There is no guarding.   Musculoskeletal:         General: No signs of injury. Normal range of motion.      Cervical back: Normal range of motion and neck supple.      Right lower leg: No edema.      Left lower leg: No edema.   Lymphadenopathy:      Cervical: No cervical adenopathy.   Skin:     General: Skin is warm and dry.      Coloration: Skin is not jaundiced.      Findings: No rash.   Neurological:      General: No focal deficit present.      Mental Status: She is alert and oriented to person, place, and time.      Gait: Gait normal.   Psychiatric:         Mood and Affect: Mood normal.         Behavior: Behavior normal.        No LMP recorded. Patient is perimenopausal.    Result Review :                   Assessment and Plan    Diagnoses and all orders for this visit:    1. Generalized anxiety disorder (Primary)  Assessment & Plan:  Chronic, stable, continue current meds, follow up as directed.  Continue Cymbalta 30    Orders:  -     Comprehensive Metabolic Panel; Future  -     CBC Auto Differential; Future  -     DULoxetine (CYMBALTA) 30 MG capsule; Take 1 capsule by mouth Daily.  Dispense: 90 capsule; Refill: 2    2. Elevated BP without diagnosis of hypertension  Assessment & Plan:  Will monitor, she has BP cuff at home and will check there and let me know if it is consistently 140s/90s or higher.       3. Vitamin D deficiency  -     Vitamin D,25-Hydroxy; Future    4. Screening, lipid  -     Lipid Panel; Future    5. Screening for endocrine disorder  -     TSH Rfx On Abnormal To Free T4; Future    6. Need for hepatitis C screening test  -     Hepatitis C Antibody;  Future    7. Need for vaccination  -     Shingrix Vaccine      Follow Up   Return in about 9 months (around 10/24/2023).    Follow up if symptoms worsen or persist or has new or concerning symptoms, go to ER for severe symptoms.   Reviewed common medication effects and side effects and advised to report side effects immediately.  Encouraged medication compliance and the importance of keeping scheduled follow up appointments with me and any other providers.  If a referral was made please contact our office if you have not heard about an appointment in the next 2 weeks.   If labs or images are ordered we will contact you with the results within the next week.  If you have not heard from us after a week please call our office to inquire about the results.   Patient was given instructions and counseling regarding her condition or for health maintenance advice. Please see specific information pulled into the AVS if appropriate.     Fatoumata Foreman PA-C    * Please note that portions of this note were completed with a voice recognition program.

## 2023-01-30 ENCOUNTER — TELEPHONE (OUTPATIENT)
Dept: INTERNAL MEDICINE | Facility: CLINIC | Age: 53
End: 2023-01-30
Payer: COMMERCIAL

## 2023-01-30 NOTE — TELEPHONE ENCOUNTER
----- Message from Fatoumata Foreman PA-C sent at 1/27/2023 10:53 PM EST -----  Labs all looked good, cholesterol was a little bit elevated so work on a heart healthy diet low in saturated fats and carbs and high in fiber.  
Pn, pt expressed understanding     
DISPLAY PLAN FREE TEXT

## 2023-04-20 RX ORDER — HYDROCHLOROTHIAZIDE 12.5 MG/1
12.5 TABLET ORAL DAILY
Qty: 30 TABLET | Refills: 2 | Status: SHIPPED | OUTPATIENT
Start: 2023-04-20

## 2023-09-15 ENCOUNTER — OFFICE VISIT (OUTPATIENT)
Dept: INTERNAL MEDICINE | Facility: CLINIC | Age: 53
End: 2023-09-15
Payer: COMMERCIAL

## 2023-09-15 VITALS
OXYGEN SATURATION: 97 % | HEIGHT: 61 IN | DIASTOLIC BLOOD PRESSURE: 84 MMHG | BODY MASS INDEX: 55.32 KG/M2 | TEMPERATURE: 97.5 F | WEIGHT: 293 LBS | RESPIRATION RATE: 14 BRPM | HEART RATE: 85 BPM | SYSTOLIC BLOOD PRESSURE: 136 MMHG

## 2023-09-15 DIAGNOSIS — M15.9 OSTEOARTHRITIS OF MULTIPLE JOINTS, UNSPECIFIED OSTEOARTHRITIS TYPE: ICD-10-CM

## 2023-09-15 DIAGNOSIS — I10 HYPERTENSION, UNSPECIFIED TYPE: ICD-10-CM

## 2023-09-15 DIAGNOSIS — E66.01 CLASS 3 SEVERE OBESITY DUE TO EXCESS CALORIES WITHOUT SERIOUS COMORBIDITY WITH BODY MASS INDEX (BMI) OF 60.0 TO 69.9 IN ADULT: ICD-10-CM

## 2023-09-15 DIAGNOSIS — Z00.00 ROUTINE GENERAL MEDICAL EXAMINATION AT A HEALTH CARE FACILITY: Primary | ICD-10-CM

## 2023-09-15 DIAGNOSIS — Z12.31 ENCOUNTER FOR SCREENING MAMMOGRAM FOR BREAST CANCER: ICD-10-CM

## 2023-09-15 DIAGNOSIS — R60.9 PERIPHERAL EDEMA: ICD-10-CM

## 2023-09-15 DIAGNOSIS — R25.1 TREMOR: ICD-10-CM

## 2023-09-15 DIAGNOSIS — F41.1 GENERALIZED ANXIETY DISORDER: ICD-10-CM

## 2023-09-15 PROBLEM — R60.0 PERIPHERAL EDEMA: Status: ACTIVE | Noted: 2023-09-15

## 2023-09-15 PROCEDURE — 99396 PREV VISIT EST AGE 40-64: CPT | Performed by: PHYSICIAN ASSISTANT

## 2023-09-15 RX ORDER — CYCLOBENZAPRINE HCL 5 MG
5-10 TABLET ORAL 2 TIMES DAILY PRN
Qty: 30 TABLET | Refills: 1 | Status: SHIPPED | OUTPATIENT
Start: 2023-09-15

## 2023-09-15 RX ORDER — HYDROCHLOROTHIAZIDE 25 MG/1
25 TABLET ORAL DAILY
Qty: 90 TABLET | Refills: 1 | Status: SHIPPED | OUTPATIENT
Start: 2023-09-15

## 2023-09-15 RX ORDER — DULOXETIN HYDROCHLORIDE 30 MG/1
30 CAPSULE, DELAYED RELEASE ORAL DAILY
Qty: 90 CAPSULE | Refills: 2 | Status: SHIPPED | OUTPATIENT
Start: 2023-09-15

## 2023-09-15 NOTE — ASSESSMENT & PLAN NOTE
Patient's (Body mass index is 66.05 kg/m².) indicates that they are morbidly obese (BMI > 40 or > 35 with obesity - related health condition) with health conditions that include osteoarthritis . Weight is improving with lifestyle modifications. BMI is is above average; BMI management plan is completed. We discussed low calorie, low carb based diet program, portion control and increasing exercise.

## 2023-09-15 NOTE — PROGRESS NOTES
Chief Complaint  Annual Exam    Subjective          History of Present Illness  Jovita Camara presents to [facility] for a routine physical.  History of Present Illness  Anxiety/Depression:  Has been on Cymbalta for the last 2 years and doing well with that. Tried 60mg dose in the past of Cymbalta but it made her to sleepy so is doing well on 30mg and not having side effects.  No HI/SI.    Tremor:  Has a right sided hand tremor on the right side. Was started on Topamax but it did not help. She had normal EMG.  Is followed by Kaycee gabriel. The tremor is worse at rest. Her right hand is also drawing up/tightening at times and wants to try a muscle relaxer to see if that helps. She at times feels like her right leg is a little shaky. She did not have head imaging.  Plans to follow-up with neuro symptoms worsen.    Vit D Def:  Is taking vit D daily.     Obesity:  Has been working on weight loss with nutritionist. Is making diet changes. Is drinking only 1 pop per day now, is using no calorie sweetener in her homemade sweet tea now.  Has previously tried Adipex and Topamax. Would be interested in the shots for weight loss.     Elevated BP/Edema:  Has had chronic swelling of legs and hctz helped some but feels like it could be stronger.   Does not have CP, SOA    Diet/exercise: seeing nutritionist, doing well with diet and working on exercise.   Eye exams: sees eye dr, wears glasses.  Works on computer all day but takes breaks to avoid headaches.  Dental exams: sees dentist regularly.    No LMP recorded. Patient is perimenopausal. is menopausal    reports that she is not currently sexually active and has had partner(s) who are male. She reports using the following methods of birth control/protection: Post-menopausal and None.  Cancer-related family history is not on file.    Last mammogram 6/2022 nl  Cologuard 1.25.22 normal  Last Pap Feb 2021, no period for the last year. Normal  paps in the past. Does not have GYN that she sees.     reports that she quit smoking about 21 years ago. Her smoking use included cigarettes. She started smoking about 35 years ago. She has a 3.25 pack-year smoking history. She has never used smokeless tobacco.     Health Maintenance   Topic Date Due    COVID-19 Vaccine (4 - Pfizer series) 02/21/2022    ZOSTER VACCINE (2 of 2) 03/21/2023    MAMMOGRAM  06/08/2023    INFLUENZA VACCINE  10/01/2023    PAP SMEAR  03/01/2024    ANNUAL PHYSICAL  09/15/2024    BMI FOLLOWUP  09/15/2024    COLORECTAL CANCER SCREENING  01/25/2025    TDAP/TD VACCINES (2 - Td or Tdap) 11/26/2031    HEPATITIS C SCREENING  Completed    Pneumococcal Vaccine 0-64  Aged Out       Immunization History   Administered Date(s) Administered    COVID-19 (PFIZER) Purple Cap Monovalent 04/10/2021, 04/30/2021, 12/27/2021    Flu Vaccine Quad PF >36MO 12/22/2017, 11/20/2020, 11/26/2021    Fluzone (or Fluarix & Flulaval for VFC) >6mos 12/22/2017, 11/20/2020, 11/26/2021    Fluzone Quad >6mos (Multi-dose) 11/11/2022    Shingrix 01/24/2023    Tdap 11/26/2021           The following portions of the patient's history were reviewed and updated as appropriate: allergies, current medications, past family history, past medical history, past social history, past surgical history and problem list.    Patient Active Problem List   Diagnosis    Tremor    Numbness and tingling in both hands    Osteoarthritis of multiple joints    Class 3 severe obesity due to excess calories without serious comorbidity with body mass index (BMI) of 60.0 to 69.9 in adult    Generalized anxiety disorder    Tear of right meniscus as current injury    Acute medial meniscus tear    HTN (hypertension)    Routine general medical examination at a health care facility    Peripheral edema     Allergies   Allergen Reactions    Molds & Smuts Cough and Headache    Seasonal Ic [Cholestatin] Other (See Comments), Cough and Headache     Leaf dust     Current  Outpatient Medications on File Prior to Visit   Medication Sig Dispense Refill    acetaminophen (TYLENOL) 500 MG tablet Take 1 tablet by mouth Every 6 (Six) Hours As Needed for Mild Pain.      calcium-vitamin D 250-100 MG-UNIT per tablet Take 1 tablet by mouth 2 (Two) Times a Day.      cetirizine (zyrTEC) 10 MG tablet Take 1 tablet by mouth Daily.      Diclofenac Sodium (VOLTAREN) 1 % gel gel Apply 4 g topically to the appropriate area as directed 4 (Four) Times a Day As Needed (pain). 200 g 1    diphenhydrAMINE-acetaminophen (TYLENOL PM)  MG tablet per tablet Take 1 tablet by mouth At Night As Needed for Sleep.      multivitamin with minerals tablet tablet Take 1 tablet by mouth Daily.       No current facility-administered medications on file prior to visit.     New Medications Ordered This Visit   Medications    DULoxetine (CYMBALTA) 30 MG capsule     Sig: Take 1 capsule by mouth Daily.     Dispense:  90 capsule     Refill:  2    cyclobenzaprine (FLEXERIL) 5 MG tablet     Sig: Take 1-2 tablets by mouth 2 (Two) Times a Day As Needed for Muscle Spasms.     Dispense:  30 tablet     Refill:  1    hydroCHLOROthiazide (HYDRODIURIL) 25 MG tablet     Sig: Take 1 tablet by mouth Daily.     Dispense:  90 tablet     Refill:  1    Semaglutide-Weight Management 0.25 MG/0.5ML solution auto-injector     Sig: Inject 0.25 mg under the skin into the appropriate area as directed 1 (One) Time Per Week.     Dispense:  2 mL     Refill:  1     Past Medical History:   Diagnosis Date    Allergic     Anxiety     Arthritis     Arthritis of back     Encephalitis     Headache     Hip arthrosis     Knee swelling     Tear of meniscus of knee     Torn meniscus 07/2022    Tremor right hand    non essential    Tremors of nervous system     essential tremors      Past Surgical History:   Procedure Laterality Date    ADENOIDECTOMY      BILATERAL INSERTION OF EAR TUBES AND ADENOIDECTOMY      KNEE ARTHROSCOPY Right 10/27/2022    Procedure: KNEE  "ARTHROSCOPY WITH PARTIAL MEDIAL MENISCECTOMY RIGHT;  Surgeon: Zia Ferraro MD;  Location: [facility];  Service: Orthopedics;  Laterality: Right;    TONSILLECTOMY        Family History   Problem Relation Age of Onset    Arthritis Mother     Hypertension Mother     Thyroid disease Mother     Hearing loss Mother     Hyperlipidemia Father     Hypertension Father     COPD Father     Hearing loss Father     Stroke Father         mid     Bipolar disorder Sister     Migraine headaches Sister     Heart attack Paternal Grandmother       Social History     Socioeconomic History    Marital status:    Tobacco Use    Smoking status: Former     Packs/day: 0.25     Years: 13.00     Pack years: 3.25     Types: Cigarettes     Start date: 1988     Quit date: 2001     Years since quittin.8    Smokeless tobacco: Never    Tobacco comments:     very light, social more than anything, heaviest during -   Vaping Use    Vaping Use: Never used   Substance and Sexual Activity    Alcohol use: Not Currently     Comment: last drink was 2 yrs ago at a wedding    Drug use: Never    Sexual activity: Not Currently     Partners: Male     Birth control/protection: Post-menopausal, None     Comment:          Objective   Vital Signs:   Vitals:    09/15/23 1316   BP: 136/84   Pulse: 85   Resp: 14   Temp: 97.5 °F (36.4 °C)   TempSrc: Infrared   SpO2: 97%   Weight: (!) 158 kg (349 lb 6.4 oz)   Height: 154.9 cm (60.98\")      Body mass index is 66.05 kg/m².  Class 3 Severe Obesity (BMI >=40). Obesity-related health conditions include the following: osteoarthritis. Obesity is improving with lifestyle modifications. BMI is is above average; BMI management plan is completed. We discussed low calorie, low carb based diet program, portion control and increasing exercise.    Physical Exam  Vitals reviewed.   Constitutional:       General: She is not in acute distress.     Appearance: Normal appearance. She is obese. " She is not ill-appearing.   HENT:      Head: Normocephalic and atraumatic.      Right Ear: Tympanic membrane, ear canal and external ear normal.      Left Ear: Tympanic membrane, ear canal and external ear normal.      Mouth/Throat:      Mouth: Mucous membranes are moist.      Pharynx: No oropharyngeal exudate or posterior oropharyngeal erythema.   Eyes:      General: No scleral icterus.     Extraocular Movements: Extraocular movements intact.      Conjunctiva/sclera: Conjunctivae normal.      Pupils: Pupils are equal, round, and reactive to light.   Neck:      Thyroid: No thyromegaly.   Cardiovascular:      Rate and Rhythm: Normal rate and regular rhythm.      Pulses: Normal pulses.      Heart sounds: Normal heart sounds. No murmur heard.  Pulmonary:      Effort: Pulmonary effort is normal. No respiratory distress.      Breath sounds: Normal breath sounds. No stridor. No wheezing, rhonchi or rales.   Abdominal:      General: Bowel sounds are normal. There is no distension.      Palpations: Abdomen is soft. There is no mass.      Tenderness: There is no abdominal tenderness. There is no guarding.   Musculoskeletal:         General: No signs of injury. Normal range of motion.      Cervical back: Normal range of motion and neck supple.      Right lower leg: Edema present.      Left lower leg: Edema present.   Lymphadenopathy:      Cervical: No cervical adenopathy.   Skin:     General: Skin is warm and dry.      Coloration: Skin is not jaundiced.      Findings: No rash.   Neurological:      General: No focal deficit present.      Mental Status: She is alert and oriented to person, place, and time.      Gait: Gait normal.   Psychiatric:         Mood and Affect: Mood normal.         Behavior: Behavior normal.        Result Review :                   Assessment and Plan    Diagnoses and all orders for this visit:    1. Routine general medical examination at a health care facility (Primary)    2. Class 3 severe obesity due  to excess calories without serious comorbidity with body mass index (BMI) of 60.0 to 69.9 in adult  Assessment & Plan:  Patient's (Body mass index is 66.05 kg/m².) indicates that they are morbidly obese (BMI > 40 or > 35 with obesity - related health condition) with health conditions that include osteoarthritis . Weight is improving with lifestyle modifications. BMI is is above average; BMI management plan is completed. We discussed low calorie, low carb based diet program, portion control and increasing exercise.     Orders:  -     Semaglutide-Weight Management 0.25 MG/0.5ML solution auto-injector; Inject 0.25 mg under the skin into the appropriate area as directed 1 (One) Time Per Week.  Dispense: 2 mL; Refill: 1    3. Generalized anxiety disorder  Assessment & Plan:  Chronic, stable, continue current meds, follow up as directed.  Continue Cymbalta 30    Orders:  -     DULoxetine (CYMBALTA) 30 MG capsule; Take 1 capsule by mouth Daily.  Dispense: 90 capsule; Refill: 2    4. Hypertension, unspecified type  Assessment & Plan:  Hypertension is stable, will inc hctz to 25 to help additionally with edema.     Orders:  -     hydroCHLOROthiazide (HYDRODIURIL) 25 MG tablet; Take 1 tablet by mouth Daily.  Dispense: 90 tablet; Refill: 1    5. Peripheral edema  Assessment & Plan:  Work on weight loss, elevate legs when able, strongly suggest compression stockings, will increase HCTZ to 25    Orders:  -     hydroCHLOROthiazide (HYDRODIURIL) 25 MG tablet; Take 1 tablet by mouth Daily.  Dispense: 90 tablet; Refill: 1    6. Tremor  Assessment & Plan:  Will trial muscle relaxer per pt req, consider f/u with neurology    Orders:  -     cyclobenzaprine (FLEXERIL) 5 MG tablet; Take 1-2 tablets by mouth 2 (Two) Times a Day As Needed for Muscle Spasms.  Dispense: 30 tablet; Refill: 1    7. Osteoarthritis of multiple joints, unspecified osteoarthritis type    8. Encounter for screening mammogram for breast cancer  -     Mammo Screening  Digital Tomosynthesis Bilateral With CAD; Future            Follow Up   Return in about 6 weeks (around 10/27/2023) for For Wegovy.    Counseled on health maintenance topics and preventative care recommendations. Follow up yearly for routine physical exam. Diet and exercise counseling given. See dentist and eye doctor yearly as directed.    If a referral was made please contact our office if you have not heard about an appointment in the next 2 weeks.   If labs or images are ordered we will contact you with the results within the next week.  If you have not heard from us after a week please call our office to inquire about the results.    Fatoumata Foreman PA-C    Patient was given instructions and counseling regarding her condition or for health maintenance advice. Please see specific information pulled into the AVS if appropriate.     * Please note that portions of this note were completed with a voice recognition program.

## 2023-09-17 NOTE — ASSESSMENT & PLAN NOTE
Work on weight loss, elevate legs when able, strongly suggest compression stockings, will increase HCTZ to 25

## 2023-09-27 ENCOUNTER — LAB (OUTPATIENT)
Dept: INTERNAL MEDICINE | Facility: CLINIC | Age: 53
End: 2023-09-27
Payer: COMMERCIAL

## 2023-09-27 DIAGNOSIS — I10 HYPERTENSION, UNSPECIFIED TYPE: ICD-10-CM

## 2023-09-27 DIAGNOSIS — E55.9 VITAMIN D DEFICIENCY: ICD-10-CM

## 2023-09-27 DIAGNOSIS — Z13.220 SCREENING, LIPID: ICD-10-CM

## 2023-09-27 DIAGNOSIS — R25.1 TREMOR: ICD-10-CM

## 2023-09-27 DIAGNOSIS — E66.01 CLASS 3 SEVERE OBESITY DUE TO EXCESS CALORIES WITHOUT SERIOUS COMORBIDITY WITH BODY MASS INDEX (BMI) OF 60.0 TO 69.9 IN ADULT: ICD-10-CM

## 2023-09-27 DIAGNOSIS — E66.01 CLASS 3 SEVERE OBESITY DUE TO EXCESS CALORIES WITHOUT SERIOUS COMORBIDITY WITH BODY MASS INDEX (BMI) OF 60.0 TO 69.9 IN ADULT: Primary | ICD-10-CM

## 2023-09-27 LAB
25(OH)D3 SERPL-MCNC: 51.4 NG/ML (ref 30–100)
ALBUMIN SERPL-MCNC: 3.8 G/DL (ref 3.5–5.2)
ALBUMIN/GLOB SERPL: 1.7 G/DL
ALP SERPL-CCNC: 59 U/L (ref 39–117)
ALT SERPL W P-5'-P-CCNC: 16 U/L (ref 1–33)
ANION GAP SERPL CALCULATED.3IONS-SCNC: 13.6 MMOL/L (ref 5–15)
AST SERPL-CCNC: 21 U/L (ref 1–32)
BASOPHILS # BLD AUTO: 0.02 10*3/MM3 (ref 0–0.2)
BASOPHILS NFR BLD AUTO: 0.3 % (ref 0–1.5)
BILIRUB SERPL-MCNC: 0.3 MG/DL (ref 0–1.2)
BUN SERPL-MCNC: 14 MG/DL (ref 6–20)
BUN/CREAT SERPL: 19.4 (ref 7–25)
CALCIUM SPEC-SCNC: 9.2 MG/DL (ref 8.6–10.5)
CHLORIDE SERPL-SCNC: 106 MMOL/L (ref 98–107)
CHOLEST SERPL-MCNC: 181 MG/DL (ref 0–200)
CO2 SERPL-SCNC: 25.4 MMOL/L (ref 22–29)
CREAT SERPL-MCNC: 0.72 MG/DL (ref 0.57–1)
DEPRECATED RDW RBC AUTO: 42.1 FL (ref 37–54)
EGFRCR SERPLBLD CKD-EPI 2021: 98.9 ML/MIN/1.73
EOSINOPHIL # BLD AUTO: 0.06 10*3/MM3 (ref 0–0.4)
EOSINOPHIL NFR BLD AUTO: 0.9 % (ref 0.3–6.2)
ERYTHROCYTE [DISTWIDTH] IN BLOOD BY AUTOMATED COUNT: 12.8 % (ref 12.3–15.4)
FOLATE SERPL-MCNC: >20 NG/ML (ref 4.78–24.2)
GLOBULIN UR ELPH-MCNC: 2.3 GM/DL
GLUCOSE SERPL-MCNC: 87 MG/DL (ref 65–99)
HBA1C MFR BLD: 5.6 % (ref 4.8–5.6)
HCT VFR BLD AUTO: 42.6 % (ref 34–46.6)
HDLC SERPL-MCNC: 54 MG/DL (ref 40–60)
HGB BLD-MCNC: 14.1 G/DL (ref 12–15.9)
IMM GRANULOCYTES # BLD AUTO: 0.02 10*3/MM3 (ref 0–0.05)
IMM GRANULOCYTES NFR BLD AUTO: 0.3 % (ref 0–0.5)
LDLC SERPL CALC-MCNC: 108 MG/DL (ref 0–100)
LDLC/HDLC SERPL: 1.97 {RATIO}
LYMPHOCYTES # BLD AUTO: 2.55 10*3/MM3 (ref 0.7–3.1)
LYMPHOCYTES NFR BLD AUTO: 39.4 % (ref 19.6–45.3)
MCH RBC QN AUTO: 29.9 PG (ref 26.6–33)
MCHC RBC AUTO-ENTMCNC: 33.1 G/DL (ref 31.5–35.7)
MCV RBC AUTO: 90.3 FL (ref 79–97)
MONOCYTES # BLD AUTO: 0.42 10*3/MM3 (ref 0.1–0.9)
MONOCYTES NFR BLD AUTO: 6.5 % (ref 5–12)
NEUTROPHILS NFR BLD AUTO: 3.41 10*3/MM3 (ref 1.7–7)
NEUTROPHILS NFR BLD AUTO: 52.6 % (ref 42.7–76)
NRBC BLD AUTO-RTO: 0 /100 WBC (ref 0–0.2)
PLATELET # BLD AUTO: 239 10*3/MM3 (ref 140–450)
PMV BLD AUTO: 11.6 FL (ref 6–12)
POTASSIUM SERPL-SCNC: 4 MMOL/L (ref 3.5–5.2)
PROT SERPL-MCNC: 6.1 G/DL (ref 6–8.5)
RBC # BLD AUTO: 4.72 10*6/MM3 (ref 3.77–5.28)
SODIUM SERPL-SCNC: 145 MMOL/L (ref 136–145)
TRIGL SERPL-MCNC: 103 MG/DL (ref 0–150)
TSH SERPL DL<=0.05 MIU/L-ACNC: 1.27 UIU/ML (ref 0.27–4.2)
VIT B12 BLD-MCNC: 680 PG/ML (ref 211–946)
VLDLC SERPL-MCNC: 19 MG/DL (ref 5–40)
WBC NRBC COR # BLD: 6.48 10*3/MM3 (ref 3.4–10.8)

## 2023-09-27 PROCEDURE — 82607 VITAMIN B-12: CPT | Performed by: PHYSICIAN ASSISTANT

## 2023-09-27 PROCEDURE — 82746 ASSAY OF FOLIC ACID SERUM: CPT | Performed by: PHYSICIAN ASSISTANT

## 2023-09-27 PROCEDURE — 36415 COLL VENOUS BLD VENIPUNCTURE: CPT | Performed by: PHYSICIAN ASSISTANT

## 2023-09-27 PROCEDURE — 80050 GENERAL HEALTH PANEL: CPT | Performed by: PHYSICIAN ASSISTANT

## 2023-09-27 PROCEDURE — 82306 VITAMIN D 25 HYDROXY: CPT | Performed by: PHYSICIAN ASSISTANT

## 2023-09-27 PROCEDURE — 83036 HEMOGLOBIN GLYCOSYLATED A1C: CPT | Performed by: PHYSICIAN ASSISTANT

## 2023-09-27 PROCEDURE — 80061 LIPID PANEL: CPT | Performed by: PHYSICIAN ASSISTANT

## 2023-10-17 ENCOUNTER — PRIOR AUTHORIZATION (OUTPATIENT)
Dept: INTERNAL MEDICINE | Facility: CLINIC | Age: 53
End: 2023-10-17
Payer: COMMERCIAL

## 2023-10-24 ENCOUNTER — CLINICAL SUPPORT (OUTPATIENT)
Dept: INTERNAL MEDICINE | Facility: CLINIC | Age: 53
End: 2023-10-24
Payer: COMMERCIAL

## 2023-10-24 DIAGNOSIS — Z23 INFLUENZA VACCINE NEEDED: Primary | ICD-10-CM

## 2023-11-07 DIAGNOSIS — R25.1 TREMOR: ICD-10-CM

## 2023-11-07 RX ORDER — CYCLOBENZAPRINE HCL 5 MG
5-10 TABLET ORAL 2 TIMES DAILY PRN
Qty: 30 TABLET | Refills: 2 | Status: SHIPPED | OUTPATIENT
Start: 2023-11-07

## 2023-11-07 NOTE — TELEPHONE ENCOUNTER
Last Appt: 9/15/2023  Next Appt: no future appt scheduled     Medication: Flexiril   Last refill: 9/15/2023  # of refills: 30 / 1    Pharmacy:   [facility]

## 2023-12-13 ENCOUNTER — TELEPHONE (OUTPATIENT)
Dept: INTERNAL MEDICINE | Facility: CLINIC | Age: 53
End: 2023-12-13
Payer: COMMERCIAL

## 2023-12-13 RX ORDER — SEMAGLUTIDE 0.68 MG/ML
0.5 INJECTION, SOLUTION SUBCUTANEOUS WEEKLY
Qty: 3 ML | Refills: 1 | Status: SHIPPED | OUTPATIENT
Start: 2023-12-13 | End: 2023-12-18 | Stop reason: SDUPTHER

## 2023-12-13 NOTE — TELEPHONE ENCOUNTER
Caller: Jovita Camara    Relationship: Self    Best call back number: [phone]    What is the best time to reach you: ANYTIME     Who are you requesting to speak with (clinical staff, provider,  specific staff member): CLINICAL STAFF     What was the call regarding: PATIENT STATES THAT SHE HAS RECEIVED THE WEGOVY MEDICATION AND SHE WILL BEING HER FIRST INJECTION ON SUNDAY. SHE DID NOT WANT TO START THE MEDICATION IN THE MIDDLE OF THE WEEK.     Is it okay if the provider responds through [facility]: YES

## 2023-12-18 RX ORDER — SEMAGLUTIDE 0.68 MG/ML
0.5 INJECTION, SOLUTION SUBCUTANEOUS WEEKLY
Qty: 3 ML | Refills: 1 | Status: SHIPPED | OUTPATIENT
Start: 2023-12-18

## 2024-01-08 ENCOUNTER — PRIOR AUTHORIZATION (OUTPATIENT)
Dept: INTERNAL MEDICINE | Facility: CLINIC | Age: 54
End: 2024-01-08
Payer: COMMERCIAL

## 2024-01-12 NOTE — TELEPHONE ENCOUNTER
Sorry, can you please run it through as Wegovy instead? Thank you, new rx sent in to [facility] new [facility]

## 2024-01-12 NOTE — TELEPHONE ENCOUNTER
Received determination today     Denied    Denial Rationale: Based on the information the doctor provided, there is insufficient clinical data to support the use of this medication for the member's condition. More information was requested from the doctor; however, it was never received. Therefore, additional information is needed to determine if the requested medication meets medical necessity criteria. Coverage for Ozempic (0.25 or 0.5 MG/DOSE) 2MG/3ML Solution Pen-Injector is denied. It does not meet medical necessity. Ozempic (0.25 or 0.5 MG/DOSE) 2MG/3ML Solution Pen-Injector has been requested for the treatment of morbid (severe) obesity. The information provided by your prescriber does not meet [facility]'s guideline. The guideline used is: Glucagon-like peptide-1 Agonists Prior Authorization with Quantity Limit. Information provided does not show that the policy requirements have been met. The requirement(s) not met are: A condition of type 2 diabetes mellitus; AND ONE of the following: - Trial and an inadequate response to an agent containing metformin or insulin; OR - confirmation that you are too sensitive to metformin or insulin; OR - confirmation that you have a Food and Drug Administration labeled sign of sickness or condition that prevent the use of BOTH metformin AND insulin; OR - a condition of type 2 diabetes with or at high risk for atherosclerotic cardiovascular disease, heart failure, and/or chronic kidney disease. Therefore, coverage for Ozempic (0.25 or 0.5 MG/DOSE) 2MG/3ML Solution Pen-Injector is denied.

## 2024-01-15 NOTE — TELEPHONE ENCOUNTER
Submitted PA through CoverMyMeds for Wegovy.     Previously approved and approval will  10/17/2024.     Called and spoke with pharmacy, it is running through her insurance but there is a high co-pay. Patient sent [employer] message in asking about alternatives.

## 2024-01-16 ENCOUNTER — PRIOR AUTHORIZATION (OUTPATIENT)
Dept: INTERNAL MEDICINE | Facility: CLINIC | Age: 54
End: 2024-01-16
Payer: COMMERCIAL

## 2024-02-04 DIAGNOSIS — R25.1 TREMOR: ICD-10-CM

## 2024-02-05 RX ORDER — CYCLOBENZAPRINE HCL 5 MG
5-10 TABLET ORAL 2 TIMES DAILY PRN
Qty: 30 TABLET | Refills: 2 | Status: SHIPPED | OUTPATIENT
Start: 2024-02-05

## 2024-02-27 ENCOUNTER — LAB (OUTPATIENT)
Dept: INTERNAL MEDICINE | Facility: CLINIC | Age: 54
End: 2024-02-27
Payer: COMMERCIAL

## 2024-02-27 DIAGNOSIS — Z11.1 SCREENING-PULMONARY TB: Primary | ICD-10-CM

## 2024-02-27 DIAGNOSIS — Z11.1 SCREENING-PULMONARY TB: ICD-10-CM

## 2024-02-27 PROCEDURE — 86480 TB TEST CELL IMMUN MEASURE: CPT | Performed by: PHYSICIAN ASSISTANT

## 2024-02-27 PROCEDURE — 36415 COLL VENOUS BLD VENIPUNCTURE: CPT | Performed by: PHYSICIAN ASSISTANT

## 2024-03-01 LAB
GAMMA INTERFERON BACKGROUND BLD IA-ACNC: 0.03 IU/ML
M TB IFN-G BLD-IMP: NEGATIVE
M TB IFN-G CD4+ BCKGRND COR BLD-ACNC: 0.04 IU/ML
M TB IFN-G CD4+CD8+ BCKGRND COR BLD-ACNC: 0.06 IU/ML
MITOGEN IGNF BCKGRD COR BLD-ACNC: >10 IU/ML
QUANTIFERON INCUBATION: NORMAL
SERVICE CMNT-IMP: NORMAL

## 2024-03-13 ENCOUNTER — HOSPITAL ENCOUNTER (OUTPATIENT)
Dept: MAMMOGRAPHY | Facility: HOSPITAL | Age: 54
Discharge: HOME | End: 2024-03-13
Payer: COMMERCIAL

## 2024-03-13 ENCOUNTER — APPOINTMENT (OUTPATIENT)
Dept: OTHER | Facility: HOSPITAL | Age: 54
End: 2024-03-13
Payer: COMMERCIAL

## 2024-03-13 DIAGNOSIS — Z12.31 ENCOUNTER FOR SCREENING MAMMOGRAM FOR BREAST CANCER: ICD-10-CM

## 2024-03-13 PROCEDURE — 77063 BREAST TOMOSYNTHESIS BI: CPT

## 2024-03-13 PROCEDURE — 77067 SCR MAMMO BI INCL CAD: CPT

## 2024-04-24 ENCOUNTER — HOSPITAL ENCOUNTER (OUTPATIENT)
Dept: ULTRASOUND IMAGING | Facility: HOSPITAL | Age: 54
Discharge: HOME | End: 2024-04-24
Admitting: RADIOLOGY
Payer: COMMERCIAL

## 2024-04-24 DIAGNOSIS — R92.8 ABNORMAL MAMMOGRAM: ICD-10-CM

## 2024-04-24 PROCEDURE — 76642 ULTRASOUND BREAST LIMITED: CPT

## 2024-04-24 PROCEDURE — 76642 ULTRASOUND BREAST LIMITED: CPT | Performed by: RADIOLOGY

## 2024-05-14 DIAGNOSIS — R25.1 TREMOR: ICD-10-CM

## 2024-05-15 RX ORDER — CYCLOBENZAPRINE HCL 5 MG
5-10 TABLET ORAL 2 TIMES DAILY PRN
Qty: 30 TABLET | Refills: 2 | Status: SHIPPED | OUTPATIENT
Start: 2024-05-15

## 2024-05-15 NOTE — TELEPHONE ENCOUNTER
Last Appt: 9/15/2023  Next Appt: no future appt scheduled     Medication: Flexeril  Last refill: 2/5/2024  # of refills: 30 / 2    Pharmacy:   [facility]

## 2024-07-30 DIAGNOSIS — F41.1 GENERALIZED ANXIETY DISORDER: ICD-10-CM

## 2024-07-30 DIAGNOSIS — R25.1 TREMOR: ICD-10-CM

## 2024-07-30 RX ORDER — DULOXETIN HYDROCHLORIDE 30 MG/1
30 CAPSULE, DELAYED RELEASE ORAL DAILY
Qty: 90 CAPSULE | Refills: 2 | Status: SHIPPED | OUTPATIENT
Start: 2024-07-30

## 2024-07-30 RX ORDER — CYCLOBENZAPRINE HCL 5 MG
5-10 TABLET ORAL 2 TIMES DAILY PRN
Qty: 30 TABLET | Refills: 2 | Status: SHIPPED | OUTPATIENT
Start: 2024-07-30

## 2024-07-30 NOTE — TELEPHONE ENCOUNTER
Last Appt: 9/15/2023  Next Appt: no future appt scheduled     Medication: Flexeril   Last refill: 5/15/2024  # of refills: 30 / 2    Medication: Cymbalta   Last refill: 9/15/2023  # of refills: 90 / 2    Pharmacy:   [facility]

## 2024-10-10 ENCOUNTER — FLU SHOT (OUTPATIENT)
Dept: INTERNAL MEDICINE | Facility: CLINIC | Age: 54
End: 2024-10-10
Payer: COMMERCIAL

## 2024-10-10 DIAGNOSIS — R25.1 TREMOR: ICD-10-CM

## 2024-10-10 DIAGNOSIS — Z23 NEED FOR VACCINATION: Primary | ICD-10-CM

## 2024-10-10 PROCEDURE — 90471 IMMUNIZATION ADMIN: CPT | Performed by: PHYSICIAN ASSISTANT

## 2024-10-10 PROCEDURE — 90656 IIV3 VACC NO PRSV 0.5 ML IM: CPT | Performed by: PHYSICIAN ASSISTANT

## 2024-10-10 PROCEDURE — 91320 SARSCV2 VAC 30MCG TRS-SUC IM: CPT | Performed by: PHYSICIAN ASSISTANT

## 2024-10-10 PROCEDURE — 90480 ADMN SARSCOV2 VAC 1/ONLY CMP: CPT | Performed by: PHYSICIAN ASSISTANT

## 2024-10-14 RX ORDER — CYCLOBENZAPRINE HCL 5 MG
5-10 TABLET ORAL 2 TIMES DAILY PRN
Qty: 30 TABLET | Refills: 2 | Status: SHIPPED | OUTPATIENT
Start: 2024-10-14

## 2024-10-14 NOTE — TELEPHONE ENCOUNTER
Rx Refill Note  Requested Prescriptions     Pending Prescriptions Disp Refills    cyclobenzaprine (FLEXERIL) 5 MG tablet 30 tablet 2     Sig: Take 1-2 tablet(s) by mouth up to 2 Times a Day As Needed for Muscle Spasms.      Last office visit with prescribing clinician: 9/15/2023   Last telemedicine visit with prescribing clinician: Visit date not found   Next office visit with prescribing clinician: Visit date not found   ATTEMPTED TO CONTACT PATIENT TO SCHEDULE FOLLOW UP VISIT. DUE TO IT BEING A YEAR SINCE LAST SEEN.   OFFICE NUMBER PROVIDED.     Nicolette Steward MA  10/14/24, 17:33 EDT

## 2025-03-04 DIAGNOSIS — Z12.11 COLON CANCER SCREENING: Primary | ICD-10-CM

## 2025-04-18 NOTE — TELEPHONE ENCOUNTER
Ok to proceed with PT as this may help.    Keep f/u with Dr. Ferraro on 7/27/2022.   Continue Regimen: Tacrolimus ointment Detail Level: Zone Render In Strict Bullet Format?: No Plan: Stop cologne, essential oil. Gentle skin care.

## 2025-05-01 DIAGNOSIS — F41.1 GENERALIZED ANXIETY DISORDER: ICD-10-CM

## 2025-05-02 RX ORDER — DULOXETIN HYDROCHLORIDE 30 MG/1
30 CAPSULE, DELAYED RELEASE ORAL DAILY
Qty: 90 CAPSULE | Refills: 2 | OUTPATIENT
Start: 2025-05-02

## 2025-05-15 DIAGNOSIS — F41.1 GENERALIZED ANXIETY DISORDER: ICD-10-CM

## 2025-05-16 RX ORDER — DULOXETIN HYDROCHLORIDE 30 MG/1
30 CAPSULE, DELAYED RELEASE ORAL DAILY
Qty: 30 CAPSULE | Refills: 0 | Status: SHIPPED | OUTPATIENT
Start: 2025-05-16 | End: 2025-05-19 | Stop reason: SDUPTHER

## 2025-05-16 NOTE — TELEPHONE ENCOUNTER
Patient has been notified she needs an appointment.  She has not been seen since September 2023.  I scheduled her an appointment for Monday.  Can you send in a 30 day fill for her?

## 2025-05-19 ENCOUNTER — LAB (OUTPATIENT)
Dept: INTERNAL MEDICINE | Facility: CLINIC | Age: 55
End: 2025-05-19
Payer: COMMERCIAL

## 2025-05-19 ENCOUNTER — OFFICE VISIT (OUTPATIENT)
Dept: INTERNAL MEDICINE | Facility: CLINIC | Age: 55
End: 2025-05-19
Payer: COMMERCIAL

## 2025-05-19 VITALS
BODY MASS INDEX: 55.32 KG/M2 | HEIGHT: 61 IN | WEIGHT: 293 LBS | TEMPERATURE: 98.4 F | HEART RATE: 78 BPM | SYSTOLIC BLOOD PRESSURE: 142 MMHG | DIASTOLIC BLOOD PRESSURE: 84 MMHG | OXYGEN SATURATION: 96 %

## 2025-05-19 DIAGNOSIS — Z13.1 SCREENING FOR DIABETES MELLITUS: ICD-10-CM

## 2025-05-19 DIAGNOSIS — Z12.31 ENCOUNTER FOR SCREENING MAMMOGRAM FOR MALIGNANT NEOPLASM OF BREAST: ICD-10-CM

## 2025-05-19 DIAGNOSIS — R25.1 TREMOR: ICD-10-CM

## 2025-05-19 DIAGNOSIS — E66.813 CLASS 3 SEVERE OBESITY DUE TO EXCESS CALORIES WITHOUT SERIOUS COMORBIDITY WITH BODY MASS INDEX (BMI) OF 60.0 TO 69.9 IN ADULT: ICD-10-CM

## 2025-05-19 DIAGNOSIS — Z13.29 SCREENING FOR ENDOCRINE DISORDER: ICD-10-CM

## 2025-05-19 DIAGNOSIS — Z13.220 SCREENING, LIPID: ICD-10-CM

## 2025-05-19 DIAGNOSIS — F41.1 GENERALIZED ANXIETY DISORDER: Primary | ICD-10-CM

## 2025-05-19 DIAGNOSIS — I10 HYPERTENSION, UNSPECIFIED TYPE: ICD-10-CM

## 2025-05-19 DIAGNOSIS — E66.01 CLASS 3 SEVERE OBESITY DUE TO EXCESS CALORIES WITHOUT SERIOUS COMORBIDITY WITH BODY MASS INDEX (BMI) OF 60.0 TO 69.9 IN ADULT: ICD-10-CM

## 2025-05-19 DIAGNOSIS — Z13.0 SCREENING FOR DEFICIENCY ANEMIA: ICD-10-CM

## 2025-05-19 PROCEDURE — 36415 COLL VENOUS BLD VENIPUNCTURE: CPT | Performed by: PHYSICIAN ASSISTANT

## 2025-05-19 PROCEDURE — 80050 GENERAL HEALTH PANEL: CPT | Performed by: PHYSICIAN ASSISTANT

## 2025-05-19 PROCEDURE — 83036 HEMOGLOBIN GLYCOSYLATED A1C: CPT | Performed by: PHYSICIAN ASSISTANT

## 2025-05-19 PROCEDURE — 80061 LIPID PANEL: CPT | Performed by: PHYSICIAN ASSISTANT

## 2025-05-19 RX ORDER — DULOXETIN HYDROCHLORIDE 30 MG/1
30 CAPSULE, DELAYED RELEASE ORAL DAILY
Qty: 90 CAPSULE | Refills: 3 | Status: SHIPPED | OUTPATIENT
Start: 2025-05-19

## 2025-05-19 RX ORDER — DULOXETIN HYDROCHLORIDE 30 MG/1
30 CAPSULE, DELAYED RELEASE ORAL DAILY
Qty: 30 CAPSULE | Refills: 0 | Status: SHIPPED | OUTPATIENT
Start: 2025-05-19 | End: 2025-05-19 | Stop reason: SDUPTHER

## 2025-05-19 RX ORDER — PROPRANOLOL HYDROCHLORIDE 60 MG/1
60 CAPSULE, EXTENDED RELEASE ORAL DAILY
Qty: 30 CAPSULE | Refills: 2 | Status: SHIPPED | OUTPATIENT
Start: 2025-05-19

## 2025-05-19 NOTE — ASSESSMENT & PLAN NOTE
Patient's (Body mass index is 69.15 kg/m².) indicates that they are morbidly obese (BMI > 40 or > 35 with obesity - related health condition) with health conditions that include hypertension and osteoarthritis . Weight is improving with lifestyle modifications. BMI is is above average; BMI management plan is completed. We discussed low calorie, low carb based diet program, portion control and increasing exercise.

## 2025-05-19 NOTE — PROGRESS NOTES
Office Note     Name: Jovita Camara    : 1970     MRN: [id number]     Chief Complaint  Med Refill and Anxiety    Subjective   History of Present Illness  Jovita Camara presents to [facility] for   History of Present Illness  Anxiety/Depression:  Has been on Cymbalta 30 for the last few years and doing well with that. Tried 60mg dose in the past of Cymbalta but it made her to sleepy so is doing well on 30mg and not having side effects.  No HI/SI.    Tremor:  Has a right sided hand tremor on the right side. Was started on Topamax but it did not help. She had normal EMG. Was evaluated by neuro and dx with essential tremor.     Vit D Def:  Is taking vit D daily.     Obesity:  Has been working on weight loss with nutritionist. Is making diet changes. Is drinking only 1 pop per day now, is using no calorie sweetener in her homemade sweet tea now.  Has previously tried Adipex and Topamax. Would be interested in the shots for weight loss but her ins won't cover them.  Started a keto gummy to see if that helps with weight loss. She is wanting homeopathic remedy for weight loss. Plans on trying apple cider vinegar again and has a tea coming in the mail to try as well.     Elevated BP/Edema:  Has had chronic swelling of legs and hctz helped a little but not enough so she stopped taking it. It is worse in the summer. She checks BP at home sometimes and it was normal. She has always had swelling of right leg more than left leg, her mother dealt with the same problem. She has not had improvement with compression stockings.   Has had a few palpitations that are brief, no tachycardia, does have anxiety. No CP or SOA.   Anxiety  Visit:  Follow-up  Follow-up visit:     Medication compliance:  %    Side effects:  Palpitations    Symptoms: dry mouth and pounding in chest      Symptoms: no chest pain, no confusion, no depressed mood, no dizziness, no excessive worry, does not have  insomnia, no irritability, no malaise/fatigue, no nausea, no obsessions and no shortness of breath      Frequency:  Rarely    Severity:  Mild    Hours of sleep per night:  7 hours    Sleep quality:  Fair      The following portions of the patient's history were reviewed and updated as appropriate: allergies, current medications, past family history, past medical history, past social history, past surgical history and problem list.  Allergies   Allergen Reactions    Molds & Smuts Cough and Headache    Seasonal Ic [Cholestatin] Other (See Comments), Cough and Headache     Leaf dust       Current Outpatient Medications:     acetaminophen (TYLENOL) 500 MG tablet, Take 1 tablet by mouth Every 6 (Six) Hours As Needed for Mild Pain., Disp: , Rfl:     calcium-vitamin D 250-100 MG-UNIT per tablet, Take 1 tablet by mouth 2 (Two) Times a Day., Disp: , Rfl:     cetirizine (zyrTEC) 10 MG tablet, Take 1 tablet by mouth Daily., Disp: , Rfl:     diphenhydrAMINE-acetaminophen (TYLENOL PM)  MG tablet per tablet, Take 1 tablet by mouth At Night As Needed for Sleep., Disp: , Rfl:     DULoxetine (CYMBALTA) 30 MG capsule, Take 1 capsule by mouth Daily., Disp: 90 capsule, Rfl: 3    multivitamin with minerals tablet tablet, Take 1 tablet by mouth Daily., Disp: , Rfl:     propranolol LA (Inderal LA) 60 MG 24 hr capsule, Take 1 capsule by mouth Daily., Disp: 30 capsule, Rfl: 2  Social History     Tobacco Use   Smoking Status Former    Current packs/day: 0.00    Average packs/day: 0.3 packs/day for 16.1 years (4.1 ttl pk-yrs)    Types: Cigarettes    Start date: 1988    Quit date: 2001    Years since quittin.5   Smokeless Tobacco Never   Tobacco Comments    very light, social more than anything, heaviest during         Objective   Vital Signs:   Vitals:    25 1259 25 1311 25 1341   BP: 148/88 142/86 142/84   Pulse: 78     Temp: 98.4 °F (36.9 °C)     TempSrc: Infrared     SpO2: 96%     Weight: (!) 166  "kg (365 lb 12.8 oz)     Height: 154.9 cm (60.98\")        Body mass index is 69.15 kg/m².  Physical Exam  Vitals reviewed.   Constitutional:       General: She is not in acute distress.     Appearance: Normal appearance. She is obese.   HENT:      Head: Normocephalic and atraumatic.   Eyes:      General: No scleral icterus.     Extraocular Movements: Extraocular movements intact.      Conjunctiva/sclera: Conjunctivae normal.   Cardiovascular:      Rate and Rhythm: Normal rate and regular rhythm.      Heart sounds: Normal heart sounds. No murmur heard.  Pulmonary:      Effort: Pulmonary effort is normal. No respiratory distress.      Breath sounds: Normal breath sounds. No stridor. No wheezing or rhonchi.   Musculoskeletal:      Cervical back: Normal range of motion and neck supple.   Skin:     General: Skin is warm and dry.      Coloration: Skin is not jaundiced.   Neurological:      General: No focal deficit present.      Mental Status: She is alert and oriented to person, place, and time.      Gait: Gait normal.   Psychiatric:         Mood and Affect: Mood normal.         Behavior: Behavior normal.        Patient's last menstrual period was 02/22/2021.  Class 3 Severe Obesity (BMI >=40). Obesity-related health conditions include the following: hypertension and osteoarthritis. Obesity is improving with lifestyle modifications. BMI is is above average; BMI management plan is completed. We discussed portion control and increasing exercise.      Result Review :         Assessment and Plan        Generalized anxiety disorder  Chronic, stable, continue current meds, follow up as directed.  Continue Cymbalta 30  Encounter for screening mammogram for malignant neoplasm of breast    Class 3 severe obesity due to excess calories without serious comorbidity with body mass index (BMI) of 60.0 to 69.9 in adult  Patient's (Body mass index is 69.15 kg/m².) indicates that they are morbidly obese (BMI > 40 or > 35 with obesity - " related health condition) with health conditions that include hypertension and osteoarthritis . Weight is improving with lifestyle modifications. BMI is is above average; BMI management plan is completed. We discussed low calorie, low carb based diet program, portion control and increasing exercise.   Hypertension, unspecified type  Chronic, uncontrolled, will start propranolol which will also help tremor  Tremor  Chronic, uncontrolled, consider f/u with neuro, will try propranolol     Orders Placed This Encounter   Procedures    Mammo Screening Digital Tomosynthesis Bilateral With CAD    Hemoglobin A1c    Comprehensive Metabolic Panel    Lipid Panel    CBC Auto Differential    TSH Rfx On Abnormal To Free T4     New Medications Ordered This Visit   Medications    propranolol LA (Inderal LA) 60 MG 24 hr capsule     Sig: Take 1 capsule by mouth Daily.     Dispense:  30 capsule     Refill:  2    DULoxetine (CYMBALTA) 30 MG capsule     Sig: Take 1 capsule by mouth Daily.     Dispense:  90 capsule     Refill:  3       Follow Up  Return in about 3 months (around 8/19/2025) for Chronic Conditions.    Follow up if symptoms worsen or persist or has new or concerning symptoms, go to ER for severe symptoms.   Reviewed common medication effects and side effects and advised to report side effects immediately.   Encouraged medication compliance and the importance of keeping scheduled follow up appointments with me and any other providers.  If a referral was made please contact our office if you have not heard about an appointment in the next 2 weeks.   If labs or images are ordered we will contact you with the results within the next week.  If you have not heard from us after a week please call our office to inquire about the results.   Patient was given instructions and counseling regarding her condition and for health maintenance advice. Please see specific information pulled into the AVS if appropriate.   All questions were  answered, the patient verbalized good understanding.     Fatoumata Foreman PA-C    * Please note that portions of this note were completed with a voice recognition program.

## 2025-05-20 LAB
ALBUMIN SERPL-MCNC: 4 G/DL (ref 3.5–5.2)
ALBUMIN/GLOB SERPL: 1.4 G/DL
ALP SERPL-CCNC: 87 U/L (ref 39–117)
ALT SERPL W P-5'-P-CCNC: 20 U/L (ref 1–33)
ANION GAP SERPL CALCULATED.3IONS-SCNC: 13.8 MMOL/L (ref 5–15)
AST SERPL-CCNC: 24 U/L (ref 1–32)
BASOPHILS # BLD AUTO: 0.02 10*3/MM3 (ref 0–0.2)
BASOPHILS NFR BLD AUTO: 0.3 % (ref 0–1.5)
BILIRUB SERPL-MCNC: 0.2 MG/DL (ref 0–1.2)
BUN SERPL-MCNC: 18 MG/DL (ref 6–20)
BUN/CREAT SERPL: 20.9 (ref 7–25)
CALCIUM SPEC-SCNC: 9.6 MG/DL (ref 8.6–10.5)
CHLORIDE SERPL-SCNC: 102 MMOL/L (ref 98–107)
CHOLEST SERPL-MCNC: 195 MG/DL (ref 0–200)
CO2 SERPL-SCNC: 22.2 MMOL/L (ref 22–29)
CREAT SERPL-MCNC: 0.86 MG/DL (ref 0.57–1)
DEPRECATED RDW RBC AUTO: 44.3 FL (ref 37–54)
EGFRCR SERPLBLD CKD-EPI 2021: 79.4 ML/MIN/1.73
EOSINOPHIL # BLD AUTO: 0.04 10*3/MM3 (ref 0–0.4)
EOSINOPHIL NFR BLD AUTO: 0.5 % (ref 0.3–6.2)
ERYTHROCYTE [DISTWIDTH] IN BLOOD BY AUTOMATED COUNT: 13.1 % (ref 12.3–15.4)
GLOBULIN UR ELPH-MCNC: 2.8 GM/DL
GLUCOSE SERPL-MCNC: 87 MG/DL (ref 65–99)
HBA1C MFR BLD: 5.7 % (ref 4.8–5.6)
HCT VFR BLD AUTO: 44.9 % (ref 34–46.6)
HDLC SERPL-MCNC: 57 MG/DL (ref 40–60)
HGB BLD-MCNC: 14.8 G/DL (ref 12–15.9)
IMM GRANULOCYTES # BLD AUTO: 0.03 10*3/MM3 (ref 0–0.05)
IMM GRANULOCYTES NFR BLD AUTO: 0.4 % (ref 0–0.5)
LDLC SERPL CALC-MCNC: 114 MG/DL (ref 0–100)
LDLC/HDLC SERPL: 1.94 {RATIO}
LYMPHOCYTES # BLD AUTO: 2.31 10*3/MM3 (ref 0.7–3.1)
LYMPHOCYTES NFR BLD AUTO: 29.4 % (ref 19.6–45.3)
MCH RBC QN AUTO: 30.1 PG (ref 26.6–33)
MCHC RBC AUTO-ENTMCNC: 33 G/DL (ref 31.5–35.7)
MCV RBC AUTO: 91.3 FL (ref 79–97)
MONOCYTES # BLD AUTO: 0.6 10*3/MM3 (ref 0.1–0.9)
MONOCYTES NFR BLD AUTO: 7.6 % (ref 5–12)
NEUTROPHILS NFR BLD AUTO: 4.87 10*3/MM3 (ref 1.7–7)
NEUTROPHILS NFR BLD AUTO: 61.8 % (ref 42.7–76)
NRBC BLD AUTO-RTO: 0 /100 WBC (ref 0–0.2)
PLATELET # BLD AUTO: 257 10*3/MM3 (ref 140–450)
PMV BLD AUTO: 10.4 FL (ref 6–12)
POTASSIUM SERPL-SCNC: 4.1 MMOL/L (ref 3.5–5.2)
PROT SERPL-MCNC: 6.8 G/DL (ref 6–8.5)
RBC # BLD AUTO: 4.92 10*6/MM3 (ref 3.77–5.28)
SODIUM SERPL-SCNC: 138 MMOL/L (ref 136–145)
TRIGL SERPL-MCNC: 137 MG/DL (ref 0–150)
TSH SERPL DL<=0.05 MIU/L-ACNC: 0.58 UIU/ML (ref 0.27–4.2)
VLDLC SERPL-MCNC: 24 MG/DL (ref 5–40)
WBC NRBC COR # BLD AUTO: 7.87 10*3/MM3 (ref 3.4–10.8)

## 2025-05-29 LAB
NCCN CRITERIA FLAG: NORMAL
TYRER CUZICK SCORE: 10.9

## 2025-06-13 ENCOUNTER — HOSPITAL ENCOUNTER (OUTPATIENT)
Dept: MAMMOGRAPHY | Facility: HOSPITAL | Age: 55
Discharge: HOME | End: 2025-06-13
Admitting: PHYSICIAN ASSISTANT
Payer: COMMERCIAL

## 2025-06-13 DIAGNOSIS — Z12.31 ENCOUNTER FOR SCREENING MAMMOGRAM FOR MALIGNANT NEOPLASM OF BREAST: ICD-10-CM

## 2025-06-13 PROCEDURE — 77067 SCR MAMMO BI INCL CAD: CPT

## 2025-06-13 PROCEDURE — 77063 BREAST TOMOSYNTHESIS BI: CPT

## 2025-08-11 DIAGNOSIS — R25.1 TREMOR: ICD-10-CM

## 2025-08-12 RX ORDER — PROPRANOLOL HYDROCHLORIDE 60 MG/1
60 CAPSULE, EXTENDED RELEASE ORAL DAILY
Qty: 30 CAPSULE | Refills: 1 | Status: SHIPPED | OUTPATIENT
Start: 2025-08-12 | End: 2025-08-18 | Stop reason: SDUPTHER

## 2025-08-18 ENCOUNTER — OFFICE VISIT (OUTPATIENT)
Dept: INTERNAL MEDICINE | Facility: CLINIC | Age: 55
End: 2025-08-18
Payer: COMMERCIAL

## 2025-08-18 VITALS
WEIGHT: 293 LBS | OXYGEN SATURATION: 98 % | BODY MASS INDEX: 55.32 KG/M2 | RESPIRATION RATE: 20 BRPM | HEART RATE: 72 BPM | SYSTOLIC BLOOD PRESSURE: 136 MMHG | DIASTOLIC BLOOD PRESSURE: 80 MMHG | HEIGHT: 61 IN | TEMPERATURE: 97.7 F

## 2025-08-18 DIAGNOSIS — R25.1 TREMOR: ICD-10-CM

## 2025-08-18 DIAGNOSIS — Z00.00 ROUTINE GENERAL MEDICAL EXAMINATION AT A HEALTH CARE FACILITY: Primary | ICD-10-CM

## 2025-08-18 DIAGNOSIS — I87.2 VENOUS INSUFFICIENCY: ICD-10-CM

## 2025-08-18 DIAGNOSIS — I10 HYPERTENSION, UNSPECIFIED TYPE: ICD-10-CM

## 2025-08-18 DIAGNOSIS — E66.813 CLASS 3 SEVERE OBESITY DUE TO EXCESS CALORIES WITHOUT SERIOUS COMORBIDITY WITH BODY MASS INDEX (BMI) GREATER THAN OR EQUAL TO 70 IN ADULT: ICD-10-CM

## 2025-08-18 DIAGNOSIS — F41.1 GENERALIZED ANXIETY DISORDER: ICD-10-CM

## 2025-08-18 DIAGNOSIS — Z12.4 CERVICAL CANCER SCREENING: ICD-10-CM

## 2025-08-18 DIAGNOSIS — R06.83 SNORING: ICD-10-CM

## 2025-08-18 DIAGNOSIS — R60.0 PERIPHERAL EDEMA: ICD-10-CM

## 2025-08-18 PROCEDURE — 99396 PREV VISIT EST AGE 40-64: CPT | Performed by: PHYSICIAN ASSISTANT

## 2025-08-18 PROCEDURE — 99214 OFFICE O/P EST MOD 30 MIN: CPT | Performed by: PHYSICIAN ASSISTANT

## 2025-08-18 RX ORDER — PROPRANOLOL HYDROCHLORIDE 120 MG/1
120 CAPSULE, EXTENDED RELEASE ORAL DAILY
Qty: 30 CAPSULE | Refills: 2 | Status: SHIPPED | OUTPATIENT
Start: 2025-08-18

## 2025-08-18 RX ORDER — FUROSEMIDE 20 MG/1
20 TABLET ORAL DAILY
Qty: 30 TABLET | Refills: 2 | Status: SHIPPED | OUTPATIENT
Start: 2025-08-18

## 2025-08-19 PROBLEM — I87.2 VENOUS INSUFFICIENCY: Status: ACTIVE | Noted: 2025-08-19

## 2025-08-19 PROBLEM — R06.83 SNORING: Status: ACTIVE | Noted: 2025-08-19

## 2025-08-28 ENCOUNTER — HOSPITAL ENCOUNTER (OUTPATIENT)
Dept: CARDIOLOGY | Facility: HOSPITAL | Age: 55
Discharge: HOME | End: 2025-08-28
Admitting: PHYSICIAN ASSISTANT
Payer: COMMERCIAL

## 2025-08-28 DIAGNOSIS — R60.0 PERIPHERAL EDEMA: ICD-10-CM

## 2025-08-28 DIAGNOSIS — I87.2 VENOUS INSUFFICIENCY: ICD-10-CM

## 2025-08-28 PROCEDURE — 93970 EXTREMITY STUDY: CPT

## 2025-08-29 LAB
BH CV LEFT LOWER VAS GSV KNEE REFLUX TIME: 2.88 SEC
BH CV LEFT LOWER VAS GSV KNEE TRANSVERSE DIAMETER: 0.6 CM
BH CV LEFT LOWER VAS GSV MID TRANSVERSE DIAMETER: 0.6 CM
BH CV LEFT LOWER VAS GSV PROX REFLUX TIME: 3.23 SEC
BH CV LEFT LOWER VAS GSV PROX TRANSVERSE DIAMETER: 0.6 CM
BH CV LEFT LOWER VAS GSVBELOW KNEE TRANSVERSE DIAMETER: 0.4 CM
BH CV LEFT LOWER VAS SAPHENOFEMORAL JUNCTION REFLUX TIME: 2.62 SEC
BH CV LEFT LOWER VAS SAPHENOFEMORAL JUNCTION TRANSVERSE DIAMETER: 0.7 CM
BH CV LEFT LOWER VAS SSV PROX CALF TRANS DIAMETER: 0.2 CM
BH CV LOWER VAS LEFT GSV DIST THIGH COMPRESSIBILTY: NORMAL
BH CV LOWER VAS RIGHT GSV DIST THIGH COMPRESSIBILTY: NORMAL
BH CV LOWER VASCULAR LEFT COMMON FEMORAL AUGMENT: NORMAL
BH CV LOWER VASCULAR LEFT COMMON FEMORAL COMPETENT: NORMAL
BH CV LOWER VASCULAR LEFT COMMON FEMORAL COMPRESS: NORMAL
BH CV LOWER VASCULAR LEFT COMMON FEMORAL PHASIC: NORMAL
BH CV LOWER VASCULAR LEFT COMMON FEMORAL SPONT: NORMAL
BH CV LOWER VASCULAR LEFT DISTAL FEMORAL COMPRESS: NORMAL
BH CV LOWER VASCULAR LEFT GREATER SAPH AK COMPETENT: NORMAL
BH CV LOWER VASCULAR LEFT GREATER SAPH AK COMPRESS: NORMAL
BH CV LOWER VASCULAR LEFT GREATER SAPH BK COMPETENT: NORMAL
BH CV LOWER VASCULAR LEFT GREATER SAPH BK COMPRESS: NORMAL
BH CV LOWER VASCULAR LEFT GSV DIST THIGH COMPETENT: NORMAL
BH CV LOWER VASCULAR LEFT LESSER SAPH COMPETENT: NORMAL
BH CV LOWER VASCULAR LEFT LESSER SAPH COMPRESS: NORMAL
BH CV LOWER VASCULAR LEFT MID FEMORAL AUGMENT: NORMAL
BH CV LOWER VASCULAR LEFT MID FEMORAL COMPETENT: NORMAL
BH CV LOWER VASCULAR LEFT MID FEMORAL COMPRESS: NORMAL
BH CV LOWER VASCULAR LEFT MID FEMORAL PHASIC: NORMAL
BH CV LOWER VASCULAR LEFT MID FEMORAL SPONT: NORMAL
BH CV LOWER VASCULAR LEFT POPLITEAL AUGMENT: NORMAL
BH CV LOWER VASCULAR LEFT POPLITEAL COMPETENT: NORMAL
BH CV LOWER VASCULAR LEFT POPLITEAL COMPRESS: NORMAL
BH CV LOWER VASCULAR LEFT POPLITEAL PHASIC: NORMAL
BH CV LOWER VASCULAR LEFT POPLITEAL SPONT: NORMAL
BH CV LOWER VASCULAR LEFT PROXIMAL FEMORAL COMPRESS: NORMAL
BH CV LOWER VASCULAR LEFT SAPHENOFEMORAL JUNCTION AUGMENT: NORMAL
BH CV LOWER VASCULAR LEFT SAPHENOFEMORAL JUNCTION COMPETENT: NORMAL
BH CV LOWER VASCULAR LEFT SAPHENOFEMORAL JUNCTION COMPRESS: NORMAL
BH CV LOWER VASCULAR LEFT SAPHENOFEMORAL JUNCTION PHASIC: NORMAL
BH CV LOWER VASCULAR LEFT SAPHENOFEMORAL JUNCTION SPONT: NORMAL
BH CV LOWER VASCULAR RIGHT COMMON FEMORAL AUGMENT: NORMAL
BH CV LOWER VASCULAR RIGHT COMMON FEMORAL COMPETENT: NORMAL
BH CV LOWER VASCULAR RIGHT COMMON FEMORAL COMPRESS: NORMAL
BH CV LOWER VASCULAR RIGHT COMMON FEMORAL PHASIC: NORMAL
BH CV LOWER VASCULAR RIGHT COMMON FEMORAL SPONT: NORMAL
BH CV LOWER VASCULAR RIGHT GREATER SAPH AK COMPETENT: NORMAL
BH CV LOWER VASCULAR RIGHT GREATER SAPH BK COMPETENT: NORMAL
BH CV LOWER VASCULAR RIGHT GREATER SAPH BK COMPRESS: NORMAL
BH CV LOWER VASCULAR RIGHT GSV DIST THIGH COMPETENT: NORMAL
BH CV LOWER VASCULAR RIGHT LESSER SAPH COMPETENT: NORMAL
BH CV LOWER VASCULAR RIGHT LESSER SAPH COMPRESS: NORMAL
BH CV LOWER VASCULAR RIGHT MID FEMORAL AUGMENT: NORMAL
BH CV LOWER VASCULAR RIGHT MID FEMORAL COMPETENT: NORMAL
BH CV LOWER VASCULAR RIGHT MID FEMORAL COMPRESS: NORMAL
BH CV LOWER VASCULAR RIGHT MID FEMORAL PHASIC: NORMAL
BH CV LOWER VASCULAR RIGHT MID FEMORAL SPONT: NORMAL
BH CV LOWER VASCULAR RIGHT POPLITEAL AUGMENT: NORMAL
BH CV LOWER VASCULAR RIGHT POPLITEAL COMPETENT: NORMAL
BH CV LOWER VASCULAR RIGHT POPLITEAL COMPRESS: NORMAL
BH CV LOWER VASCULAR RIGHT POPLITEAL PHASIC: NORMAL
BH CV LOWER VASCULAR RIGHT POPLITEAL SPONT: NORMAL
BH CV LOWER VASCULAR RIGHT PROXIMAL FEMORAL COMPRESS: NORMAL
BH CV LOWER VASCULAR RIGHT SAPHENOFEMORAL JUNCTION AUGMENT: NORMAL
BH CV LOWER VASCULAR RIGHT SAPHENOFEMORAL JUNCTION COMPETENT: NORMAL
BH CV LOWER VASCULAR RIGHT SAPHENOFEMORAL JUNCTION COMPRESS: NORMAL
BH CV LOWER VASCULAR RIGHT SAPHENOFEMORAL JUNCTION PHASIC: NORMAL
BH CV LOWER VASCULAR RIGHT SAPHENOFEMORAL JUNCTION SPONT: NORMAL
BH CV RIGHT LOWER VAS GSV KNEE TRANS DIAMETER: 0.7 CM
BH CV RIGHT LOWER VAS GSV MID THIGH TRANS DIAMETER: 0.6 CM
BH CV RIGHT LOWER VAS GSV PROX CALF TRANS DIAMETER: 0.4 CM
BH CV RIGHT LOWER VAS GSV PROX THIGH TRANS DIAMETER: 0.5 CM
BH CV RIGHT LOWER VAS MID FEMORAL REFLUX TIME: 3.76 SEC
BH CV RIGHT LOWER VAS SAPHENOFEM JUNCTION REFLUX TIME: 3.73 SEC
BH CV RIGHT LOWER VAS SAPHENOFEM JUNCTION TRANSVERSE DIAMETER: 0.6 CM
BH CV RIGHT LOWER VAS SSV MID CALF TRANS DIAMETER: 0.2 CM
BH CV RIGHT LOWER VAS SSV PROX CALF TRANS DIAMETER: 0.4 CM
BH CV VAS RIGHT GSV PROXIMAL HIDDEN LRR COMPRESSIBILTY: NORMAL

## (undated) DEVICE — BLANKT WARM UPPR/BDY ARM/OUT 57X196CM

## (undated) DEVICE — SUT MNCRYL PLS ANTIB UD 4/0 PS2 18IN

## (undated) DEVICE — NDL HYPO ECLPS SFTY 22G 1 1/2IN

## (undated) DEVICE — DYONICS 25 INFLOW TUBE SET, 3 PER BOX

## (undated) DEVICE — STRIP,CLOSURE,WOUND,MEDI-STRIP,1/2X4: Brand: MEDLINE

## (undated) DEVICE — SHT AIR TRANSFR COMFRT GLIDE LAT 40X80IN

## (undated) DEVICE — GLV SURG SENSICARE PI ORTHO SZ8.5 LF STRL

## (undated) DEVICE — BNDG ESMARK 6INX9FT STRL

## (undated) DEVICE — PK ARTHSCP KN 10

## (undated) DEVICE — SYR LL TP 10ML STRL

## (undated) DEVICE — UNDRPD COMFRT GLD DRYPAD 36X57IN

## (undated) DEVICE — UNDERCAST PADDING: Brand: DEROYAL

## (undated) DEVICE — GLV SURG PREMIERPRO MIC LTX PF SZ8.5 BRN